# Patient Record
Sex: FEMALE | Race: WHITE | Employment: STUDENT | URBAN - METROPOLITAN AREA
[De-identification: names, ages, dates, MRNs, and addresses within clinical notes are randomized per-mention and may not be internally consistent; named-entity substitution may affect disease eponyms.]

---
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
## 2020-01-25 ENCOUNTER — OFFICE VISIT (OUTPATIENT)
Dept: URGENT CARE | Facility: URGENT CARE | Age: 20
End: 2020-01-25
Payer: COMMERCIAL

## 2020-01-25 VITALS
DIASTOLIC BLOOD PRESSURE: 85 MMHG | OXYGEN SATURATION: 100 % | SYSTOLIC BLOOD PRESSURE: 123 MMHG | HEART RATE: 94 BPM | TEMPERATURE: 97.9 F

## 2020-01-25 DIAGNOSIS — J03.90 TONSILLITIS: Primary | ICD-10-CM

## 2020-01-25 DIAGNOSIS — R07.0 THROAT PAIN: ICD-10-CM

## 2020-01-25 DIAGNOSIS — J02.9 ACUTE PHARYNGITIS, UNSPECIFIED ETIOLOGY: ICD-10-CM

## 2020-01-25 LAB
BASOPHILS # BLD AUTO: 0 10E9/L (ref 0–0.2)
BASOPHILS NFR BLD AUTO: 0.2 %
DEPRECATED S PYO AG THROAT QL EIA: NORMAL
DIFFERENTIAL METHOD BLD: NORMAL
EOSINOPHIL # BLD AUTO: 0 10E9/L (ref 0–0.7)
EOSINOPHIL NFR BLD AUTO: 0.3 %
ERYTHROCYTE [DISTWIDTH] IN BLOOD BY AUTOMATED COUNT: 12.2 % (ref 10–15)
HCT VFR BLD AUTO: 39.6 % (ref 35–47)
HETEROPH AB SER QL: NEGATIVE
HGB BLD-MCNC: 13.4 G/DL (ref 11.7–15.7)
LYMPHOCYTES # BLD AUTO: 1.4 10E9/L (ref 0.8–5.3)
LYMPHOCYTES NFR BLD AUTO: 14.9 %
MCH RBC QN AUTO: 29.6 PG (ref 26.5–33)
MCHC RBC AUTO-ENTMCNC: 33.8 G/DL (ref 31.5–36.5)
MCV RBC AUTO: 88 FL (ref 78–100)
MONOCYTES # BLD AUTO: 0.8 10E9/L (ref 0–1.3)
MONOCYTES NFR BLD AUTO: 8.4 %
NEUTROPHILS # BLD AUTO: 7.2 10E9/L (ref 1.6–8.3)
NEUTROPHILS NFR BLD AUTO: 76.2 %
PLATELET # BLD AUTO: 221 10E9/L (ref 150–450)
RBC # BLD AUTO: 4.52 10E12/L (ref 3.8–5.2)
SPECIMEN SOURCE: NORMAL
WBC # BLD AUTO: 9.4 10E9/L (ref 4–11)

## 2020-01-25 PROCEDURE — 86308 HETEROPHILE ANTIBODY SCREEN: CPT | Performed by: PHYSICIAN ASSISTANT

## 2020-01-25 PROCEDURE — 87880 STREP A ASSAY W/OPTIC: CPT | Performed by: PHYSICIAN ASSISTANT

## 2020-01-25 PROCEDURE — 87081 CULTURE SCREEN ONLY: CPT | Performed by: PHYSICIAN ASSISTANT

## 2020-01-25 PROCEDURE — 99214 OFFICE O/P EST MOD 30 MIN: CPT | Performed by: PHYSICIAN ASSISTANT

## 2020-01-25 PROCEDURE — 85025 COMPLETE CBC W/AUTO DIFF WBC: CPT | Performed by: PHYSICIAN ASSISTANT

## 2020-01-25 PROCEDURE — 36415 COLL VENOUS BLD VENIPUNCTURE: CPT | Performed by: PHYSICIAN ASSISTANT

## 2020-01-25 RX ORDER — CLINDAMYCIN HCL 300 MG
300 CAPSULE ORAL 3 TIMES DAILY
Qty: 30 CAPSULE | Refills: 0 | Status: SHIPPED | OUTPATIENT
Start: 2020-01-25 | End: 2020-01-27 | Stop reason: SINTOL

## 2020-01-25 NOTE — PROGRESS NOTES
SUBJECTIVE:  Maryam Carlson is a 20 year old female with a chief complaint of sore throat.  Onset of symptoms was 2 day(s) ago.    Course of illness: sudden onset, still present and worsening.  Severity moderate  Current and Associated symptoms: runny nose, stuffy nose, cough - productive, headache and fatigue  Treatment measures tried include Tylenol/Ibuprofen.  Predisposing factors include None.    Past Medical History:   Diagnosis Date     Asthma      Current Outpatient Medications   Medication Sig Dispense Refill     albuterol (PROAIR HFA/PROVENTIL HFA/VENTOLIN HFA) 108 (90 Base) MCG/ACT inhaler Inhale 2 puffs into the lungs every 6 hours as needed for shortness of breath / dyspnea or wheezing 1 Inhaler 3     citalopram (CELEXA) 20 MG tablet 1 tablet daily 90 tablet 1     ranitidine (ZANTAC) 300 MG tablet Take 1 tablet (300 mg) by mouth At Bedtime 30 tablet 1     Social History     Tobacco Use     Smoking status: Passive Smoke Exposure - Never Smoker     Smokeless tobacco: Never Used     Tobacco comment: Lives in smoke free household, Dad smokes outside   Substance Use Topics     Alcohol use: No       ROS:  CONSTITUTIONAL:fatigue  EYES: NEGATIVE for vision changes or irritation  ENT/MOUTH: nasal congestion and sore throat  RESP:cough-non productive very mild    OBJECTIVE:   /85   Pulse 94   Temp 97.9  F (36.6  C) (Tympanic)   SpO2 100%   GENERAL APPEARANCE: healthy, alert and no distress  EYES: EOMI,  PERRL, conjunctiva clear  HENT: TM's normal bilaterally, tonsillar hypertrophy, tonsillar erythema and tonsillar exudate  NECK: supple, non-tender to palpation, no adenopathy noted  RESP: lungs clear to auscultation - no rales, rhonchi or wheezes  CV: regular rates and rhythm, normal S1 S2, no murmur noted  SKIN: no suspicious lesions or rashes    Rapid Strep test is negative    ASSESSMENT:      1. Tonsillitis    - clindamycin (CLEOCIN) 300 MG capsule; Take 1 capsule (300 mg) by mouth 3 times daily for 10  days  Dispense: 30 capsule; Refill: 0    2. Acute pharyngitis, unspecified etiology    - CBC with platelets and differential  - Mononucleosis screen  - clindamycin (CLEOCIN) 300 MG capsule; Take 1 capsule (300 mg) by mouth 3 times daily for 10 days  Dispense: 30 capsule; Refill: 0    3. Throat pain    - Strep, Rapid Screen  - Beta strep group A culture  - CBC with platelets and differential  - Mononucleosis screen  - clindamycin (CLEOCIN) 300 MG capsule; Take 1 capsule (300 mg) by mouth 3 times daily for 10 days  Dispense: 30 capsule; Refill: 0    PLAN:   See orders in epic.   Symptomatic treat with gargles, lozenges, and OTC analgesic as needed. Follow-up with primary clinic if not improving over the next 3 days.  Advisement given that patient will be contagious for the next 24-48 hours after antibiotics initiated

## 2020-01-26 LAB
BACTERIA SPEC CULT: NORMAL
SPECIMEN SOURCE: NORMAL

## 2020-01-27 ENCOUNTER — MYC MEDICAL ADVICE (OUTPATIENT)
Dept: FAMILY MEDICINE | Facility: CLINIC | Age: 20
End: 2020-01-27

## 2020-01-27 DIAGNOSIS — J02.0 STREPTOCOCCAL PHARYNGITIS: Primary | ICD-10-CM

## 2020-01-27 RX ORDER — CEPHALEXIN 500 MG/1
500 CAPSULE ORAL 2 TIMES DAILY
Qty: 14 CAPSULE | Refills: 0 | Status: SHIPPED | OUTPATIENT
Start: 2020-01-27 | End: 2021-07-02

## 2020-01-27 NOTE — TELEPHONE ENCOUNTER
Left message on answering machine for patient/parent to call back.   [phone].  Radha Oliveira RN

## 2020-01-27 NOTE — TELEPHONE ENCOUNTER
Patient seen in urgent care Saturday  Patient prescribed Clindamycin for the following diagnoses:     Tonsillitis [J03.90]  - Primary       Throat pain [R07.0]       Acute pharyngitis, unspecified etiology           Patient has broken out in rash as noted below      To provider to advise      Melody HAMLINN, RN, CPN

## 2020-01-27 NOTE — TELEPHONE ENCOUNTER
Patient/parent is informed of MD note below, as it is written. Verbalized good understanding.  Patient reports not as sore, not as bad as it used to be.   Patient had 2.5 doses please advise  Radha Oliveira RN

## 2020-01-27 NOTE — TELEPHONE ENCOUNTER
I am aware that the patient has a pcn allergy and should consider getting testing with Dr. Potter.  If she received 3 days of the medication she does not need to take more if she is feeling better.  I have prescribed Cephalexin which has about a 6% reaction when there is a pcn reaction.she should fill this if not feeling better.  Strep may also cause a rash which is another reason to see Dr Potter.

## 2020-01-28 NOTE — TELEPHONE ENCOUNTER
Patient/parent is informed of MD note below, as it is written. Verbalized good understanding.  Radha Oliveira RN

## 2020-01-29 ENCOUNTER — MYC MEDICAL ADVICE (OUTPATIENT)
Dept: FAMILY MEDICINE | Facility: CLINIC | Age: 20
End: 2020-01-29

## 2020-01-29 NOTE — TELEPHONE ENCOUNTER
Per protocol, will route encounter to be cosigned by provider for Verbal Orders.  Radha Oliveira RN

## 2020-02-23 ENCOUNTER — HEALTH MAINTENANCE LETTER (OUTPATIENT)
Age: 20
End: 2020-02-23

## 2020-02-27 ENCOUNTER — OFFICE VISIT (OUTPATIENT)
Dept: ALLERGY | Facility: CLINIC | Age: 20
End: 2020-02-27
Payer: COMMERCIAL

## 2020-02-27 ENCOUNTER — TELEPHONE (OUTPATIENT)
Dept: ALLERGY | Facility: CLINIC | Age: 20
End: 2020-02-27

## 2020-02-27 ENCOUNTER — TELEPHONE (OUTPATIENT)
Dept: ALLERGY | Facility: OTHER | Age: 20
End: 2020-02-27

## 2020-02-27 VITALS
DIASTOLIC BLOOD PRESSURE: 73 MMHG | TEMPERATURE: 97.7 F | OXYGEN SATURATION: 99 % | WEIGHT: 115 LBS | HEIGHT: 64 IN | HEART RATE: 80 BPM | SYSTOLIC BLOOD PRESSURE: 120 MMHG | BODY MASS INDEX: 19.63 KG/M2

## 2020-02-27 DIAGNOSIS — J30.89 ALLERGIC RHINITIS DUE TO MOLD: ICD-10-CM

## 2020-02-27 DIAGNOSIS — J30.89 ALLERGIC RHINITIS DUE TO DUST MITE: Primary | ICD-10-CM

## 2020-02-27 DIAGNOSIS — Z91.018 TREE NUT ALLERGY: ICD-10-CM

## 2020-02-27 DIAGNOSIS — Z51.6 NEED FOR DESENSITIZATION TO ALLERGENS: ICD-10-CM

## 2020-02-27 DIAGNOSIS — T78.1XXD POLLEN-FOOD ALLERGY, SUBSEQUENT ENCOUNTER: ICD-10-CM

## 2020-02-27 DIAGNOSIS — J45.20 MILD INTERMITTENT ASTHMA WITHOUT COMPLICATION: Primary | ICD-10-CM

## 2020-02-27 DIAGNOSIS — J45.30 MILD PERSISTENT ASTHMA WITHOUT COMPLICATION: Primary | ICD-10-CM

## 2020-02-27 DIAGNOSIS — J30.1 SEASONAL ALLERGIC RHINITIS DUE TO POLLEN: ICD-10-CM

## 2020-02-27 DIAGNOSIS — Z88.0 PENICILLIN ALLERGY: ICD-10-CM

## 2020-02-27 DIAGNOSIS — J30.81 ALLERGIC RHINITIS DUE TO ANIMAL DANDER: ICD-10-CM

## 2020-02-27 DIAGNOSIS — J30.89 ALLERGIC RHINITIS DUE TO DUST MITE: ICD-10-CM

## 2020-02-27 PROBLEM — H10.9 RHINOCONJUNCTIVITIS: Status: ACTIVE | Noted: 2020-02-27

## 2020-02-27 PROBLEM — J31.0 RHINOCONJUNCTIVITIS: Status: ACTIVE | Noted: 2020-02-27

## 2020-02-27 LAB
FEF 25/75: NORMAL
FEV-1: NORMAL
FEV1/FVC: NORMAL
FVC: NORMAL

## 2020-02-27 PROCEDURE — 99204 OFFICE O/P NEW MOD 45 MIN: CPT | Mod: 25 | Performed by: ALLERGY & IMMUNOLOGY

## 2020-02-27 PROCEDURE — 95004 PERQ TESTS W/ALRGNC XTRCS: CPT | Performed by: ALLERGY & IMMUNOLOGY

## 2020-02-27 PROCEDURE — 94010 BREATHING CAPACITY TEST: CPT | Performed by: ALLERGY & IMMUNOLOGY

## 2020-02-27 RX ORDER — BUDESONIDE AND FORMOTEROL FUMARATE DIHYDRATE 80; 4.5 UG/1; UG/1
AEROSOL RESPIRATORY (INHALATION)
Qty: 1 INHALER | Refills: 3 | Status: SHIPPED | OUTPATIENT
Start: 2020-02-27

## 2020-02-27 RX ORDER — TRIAMCINOLONE ACETONIDE 55 UG/1
2 SPRAY, METERED NASAL DAILY
Qty: 2 BOTTLE | Refills: 3 | Status: SHIPPED | OUTPATIENT
Start: 2020-02-27 | End: 2020-06-29

## 2020-02-27 RX ORDER — MONTELUKAST SODIUM 10 MG/1
10 TABLET ORAL AT BEDTIME
Qty: 30 TABLET | Refills: 4 | Status: SHIPPED | OUTPATIENT
Start: 2020-02-27

## 2020-02-27 RX ORDER — OLOPATADINE HYDROCHLORIDE 1 MG/ML
1 SOLUTION/ DROPS OPHTHALMIC 2 TIMES DAILY
Qty: 1 BOTTLE | Refills: 4 | Status: SHIPPED | OUTPATIENT
Start: 2020-02-27 | End: 2020-06-29

## 2020-02-27 RX ORDER — EPINEPHRINE 0.3 MG/.3ML
0.3 INJECTION SUBCUTANEOUS PRN
Qty: 2 EACH | Refills: 1 | Status: SHIPPED | OUTPATIENT
Start: 2020-02-27

## 2020-02-27 RX ORDER — ALBUTEROL SULFATE 90 UG/1
2-4 AEROSOL, METERED RESPIRATORY (INHALATION) EVERY 4 HOURS PRN
Qty: 1 INHALER | Refills: 1 | Status: SHIPPED | OUTPATIENT
Start: 2020-02-27

## 2020-02-27 ASSESSMENT — ASTHMA QUESTIONNAIRES
QUESTION_5 LAST FOUR WEEKS HOW WOULD YOU RATE YOUR ASTHMA CONTROL: WELL CONTROLLED
QUESTION_3 LAST FOUR WEEKS HOW OFTEN DID YOUR ASTHMA SYMPTOMS (WHEEZING, COUGHING, SHORTNESS OF BREATH, CHEST TIGHTNESS OR PAIN) WAKE YOU UP AT NIGHT OR EARLIER THAN USUAL IN THE MORNING: ONCE A WEEK
ACT_TOTALSCORE: 18
QUESTION_4 LAST FOUR WEEKS HOW OFTEN HAVE YOU USED YOUR RESCUE INHALER OR NEBULIZER MEDICATION (SUCH AS ALBUTEROL): ONCE A WEEK OR LESS
QUESTION_1 LAST FOUR WEEKS HOW MUCH OF THE TIME DID YOUR ASTHMA KEEP YOU FROM GETTING AS MUCH DONE AT WORK, SCHOOL OR AT HOME: A LITTLE OF THE TIME
QUESTION_2 LAST FOUR WEEKS HOW OFTEN HAVE YOU HAD SHORTNESS OF BREATH: THREE TO SIX TIMES A WEEK
ACUTE_EXACERBATION_TODAY: YES

## 2020-02-27 ASSESSMENT — MIFFLIN-ST. JEOR: SCORE: 1276.64

## 2020-02-27 NOTE — TELEPHONE ENCOUNTER
Forwarding to Dr. Potter - please send rx for albuterol as discussed, thanks.    Patient will switch from PRN Symbicort to albuterol as the Symbicort was $200+/mo for her.  Pharmacist notified already.    Colette Streeter RN

## 2020-02-27 NOTE — PATIENT INSTRUCTIONS
Allergy Staff Appt Hours Shot Hours Locations    Physician     Bertrand Potter DO       Support Staff     NADIA Doran, [facility]  Tuesday:        [place] 7-4:20     Wednesday:        [place]: 7-5     Thursday:                    [place] 7-6:40     Friday:  [place]  7-2:40   [place]        Thursday: 1-5:50        Friday: 7-10:50     [place]        Tuesday: 7- 3:20        Wednesday: 7-4:20     [place] Monday: 7-4:20        Tuesday: 1-6:20         [facility]  [address] [place] [address]  Appt Line: [phone]  Allergy RN:  [phone]    [facility]  [address] [place] [address]  Appt Line: [phone]  Allergy RN:  [phone]       Important Scheduling Information  Aspirin Desensitization: Appt will last 2 clinic days. Please call the Allergy RN line for your clinic to schedule. Discontinue antihistamines 7 days prior to the appointment.     Food Challenges: Appt will last 3-4 hours. Please call the Allergy RN line for your clinic to schedule. Discontinue antihistamines 7 days prior to the appointment.     Penicillin Testing: Appt will last 2-3 hours. Please call the Allergy RN line for your clinic to schedule. Discontinue antihistamines 7 days prior to the appointment.     Skin Testing: Appt will about 40 minutes. Call the appointment line for your clinic to schedule. Discontinue antihistamines 7 days prior to the appointment.     Venom Testing: Appt will last 2-3 hours. Please call the Allergy RN line for your clinic to schedule. Discontinue antihistamines 7 days prior to the appointment.     Thank you for trusting us with your Allergy, Asthma, and Immunology care. Please feel free to contact us with any questions or concerns you may have.      -Nasacort 2 sprays per nostril daily.  -Zyrtec daily.  -Patanol 1 drop per eye twice daily as needed.  -Singulair 10 mg by mouth daily.  -Symbicort 80/4.5mcg 2 puffs inhaled every 4 hours as needed for chest  tightness, coughing, wheezing or shortness of breath.   -Return to clinic for penicillin testing.    AEROALLERGEN AVOIDANCE INSTRUCTIONS  MOLD  Indoors, mold season is year round. Outdoors, most mold prefer seasons with high humidity. Mold prefers damp, dark, warm places. Here are some tips on how to avoid mold exposure.  1.  Keep humidity inside between 35-50% with air conditioning or dehumidifier. The humidity level can be checked with a meter from a hardware store.   2.  Clean surfaces where mold grows and dry wet areas.  3.  Avoid steam cleaning carpets and discard moldy belongings.  4.  Wear a mask when doing yard work and refrain from walking through uncut fields or playing in leaves.  5.  Minimize use of potted plants and do not keep them indoors.  6.  Consider an allergy cover for the pillow and mattress.  POLLEN  Pollens are the tiny airborne particles given off by trees, weeds, and grasses. They can be the cause of seasonal allergic rhinitis or hay fever symptoms, which include stuffy, itchy, runny nose, redness, swelling and itching of the eyes, and itching of the ears and throat. Here are some tips on how to avoid pollen exposure.  1. .Keep windows closed and use the air conditioner when possible.  2.  Avoid outside exposure in the early morning as pollen counts are highest at that time.  3.  Take a shower and wash hair each night.  4.  Consider wearing a mask when working in the yard and/or garden.  5.  Clean furnace filter monthly with HEPA filters. Consider a HEPA filter vacuum  which will prevent pollen from being reintroduced into the air.   DUST MITES  Dust mites can never be entirely eliminated in the house no matter how clean your house is. Dust mites are attracted to warm, moist areas and feed on dead skin flakes. Here are tips to minimize dust mites in your home.  1.  Encase pillows and mattress/box springs in zippered allergy covers.  2.  Wash bedding in hot water (at least 130 F) every  7-14 days.  3.  Avoid curtains, carpet, and upholstered furniture if possible.  4.  Use HEPA air filters and a HEPA filter vacuum . Change filters monthly. Vacuum weekly.  5.  Keep bedroom simple, avoiding clutter, so it can quickly be dusted.  6.  Cover heating vents with vent filters.  7.  Keep stuffed toys in a closed container and wash or freeze regularly.  8.  Keep clothing in the closet with the door closed.  PETS  Pets present many problems for people with allergies. Dander from pets is very difficult to remove and also is a food source for dust mites.  1.  If possible, find the pet a new home.  2.  If not possible, keep the pet outdoors. Never allow the pet into the bedroom.  3.  Wash pet weekly in warm water.  4.  Encase mattresses, pillows, and box springs in allergen-proof covers.  5.  Use HEPA air filters and a HEPA filter vacuum . Change filters monthly.    ACCELERATED IMMUNOTHERAPY PATIENT INFORMATION    Immunotherapy is a treatment that alters the patient's immune system so they have less allergy symptoms, use less medications to control symptoms, have improved quality of life, and less health care utilization    Accelerated immunotherapy schedules are designed to allow patients to reach their maintenance immunotherapy dose in a shorter time frame than conventional immunotherapy.    Clinical benefit can be reached more rapidly using accelerated immunotherapy.     Many patients do not want to start allergen immunotherapy secondary to the upfront time commitment associated with conventional allergy shots. Cluster immunotherapy would allow the patient to be on monthly injections in a much shorter period of time. The maintenance dose is typically reached within 8 to 9 weeks.     Cluster immunotherapy has a similar risk of systemic reaction to conventional immunotherapy. The patient will need to take oral antihistamines to pre-medicate prior to injection appointments while going through this  treatment.    CLUSTER IMMUNOTHERAPY PATIENT INSTRUCTIONS    Asthma medications must be continued and asthma must be well controlled prior to receiving CLUSTER immunotherapy. Immunotherapy should not be given if you are feeling ill.    Other allergy medications may be continued    You should plan to spend approximately 2 hours at the clinic. Please feel free to bring things to occupy your time such as books, work, or computers. You may also wish to bring something to eat as you will not be able to leave the clinic once the procedure has begun.    You will be required to bring an epinephrine auto-injector with you to your CLUSTER immunotherapy appointments and all subsequent allergy shot appointments    You will be required to take the following pre-medication regimen prior  to your CLUSTER immunotherapy appointments  o Medications to be taken 1 day prior to CLUSTER:  - Zyrtec (cetirizine) 20mg or Allegra (fexofenadine) 360mg twice daily  o Medications to be taken the morning of CLUSTER:  - Zyrtec (cetirizine) 20mg or Allegra (fexofenadine) 360mg

## 2020-02-27 NOTE — ASSESSMENT & PLAN NOTE
History of hives after receiving amoxicillin at 8 years of age.  Subsequently avoided all penicillin antibiotics.    - Discussed with patient and family that only about 15% of those that think they are allergic actually are and of those 15%, 80% outgrow their penicillin allergy within 10 years.   - Fortunately, there is skin testing to ascertain if patient is truly allergic.   - Would recommend patient return to clinic for penicillin testing and oral challenge. Discussed testing with family and patient.

## 2020-02-27 NOTE — LETTER
My Asthma Action Plan    Name: Maryam Carlson   YOB: 2000  Date: 2/27/2020   My doctor: Bertrand Potter, DO   My clinic: [facility]        My Control Medicine: Montelukast (Singulair) -  10 mg daily  My Rescue Medicine:   Symbicort 80/4.5mcg 2 puffs inhaled every 4 hours as needed for chest tightness, coughing, wheezing or shortness of breath.    My Asthma Severity:   Intermittent / Exercise Induced  Know your asthma triggers: animal dander              GREEN ZONE   Good Control    I feel good    No cough or wheeze    Can work, sleep and play without asthma symptoms       Take your asthma control medicine every day.     1. If exercise triggers your asthma, take your rescue medication    15 minutes before exercise or sports, and    During exercise if you have asthma symptoms  2. Spacer to use with inhaler: If you have a spacer, make sure to use it with your inhaler             YELLOW ZONE Getting Worse  I have ANY of these:    I do not feel good    Cough or wheeze    Chest feels tight    Wake up at night   1. Keep taking your Green Zone medications  2. Start taking your rescue medicine:    every 20 minutes for up to 1 hour. Then every 4 hours for 24-48 hours.  3. If you stay in the Yellow Zone for more than 12-24 hours, contact your doctor.  4. If you do not return to the Green Zone in 12-24 hours or you get worse, start taking your oral steroid medicine if prescribed by your provider.           RED ZONE Medical Alert - Get Help  I have ANY of these:    I feel awful    Medicine is not helping    Breathing getting harder    Trouble walking or talking    Nose opens wide to breathe       1. Take your rescue medicine NOW  2. If your provider has prescribed an oral steroid medicine, start taking it NOW  3. Call your doctor NOW  4. If you are still in the Red Zone after 20 minutes and you have not reached your doctor:    Take your rescue medicine again and    Call 911 or go to the emergency  room right away    See your regular doctor within 2 weeks of an Emergency Room or Urgent Care visit for follow-up treatment.          Annual Reminders:  Meet with Asthma Educator,  Flu Shot in the Fall, consider Pneumonia Vaccination for patients with asthma (aged 19 and older).    Pharmacy:    [id number] - [place], [place] - [address] [id number] - [place], [place] - [address]    Electronically signed by Bertrand Potter, DO   Date: 02/27/20                      Asthma Triggers  How To Control Things That Make Your Asthma Worse    Triggers are things that make your asthma worse.  Look at the list below to help you find your triggers and what you can do about them.  You can help prevent asthma flare-ups by staying away from your triggers.      Trigger                                                          What you can do   Cigarette Smoke  Tobacco smoke can make asthma worse. Do not allow smoking in your home, car or around you.  Be sure no one smokes at a child s day care or school.  If you smoke, ask your health care provider for ways to help you quit.  Ask family members to quit too.  Ask your health care provider for a referral to Quit Plan to help you quit smoking, or call 4-191-798-PLAN.     Colds, Flu, Bronchitis  These are common triggers of asthma. Wash your hands often.  Don t touch your eyes, nose or mouth.  Get a flu shot every year.     Dust Mites  These are tiny bugs that live in cloth or carpet. They are too small to see. Wash sheets and blankets in hot water every week.   Encase pillows and mattress in dust mite proof covers.  Avoid having carpet if you can. If you have carpet, vacuum weekly.   Use a dust mask and HEPA vacuum.   Pollen and Outdoor Mold  Some people are allergic to trees, grass, or weed pollen, or molds. Try to keep your windows closed.  Limit time out doors when pollen count is high.   Ask you health care provider about taking medicine  during allergy season.     Animal Dander  Some people are allergic to skin flakes, urine or saliva from pets with fur or feathers. Keep pets with fur or feathers out of your home.    If you can t keep the pet outdoors, then keep the pet out of your bedroom.  Keep the bedroom door closed.  Keep pets off cloth furniture and away from stuffed toys.     Mice, Rats, and Cockroaches   Some people are allergic to the waste from these pests.   Cover food and garbage.  Clean up spills and food crumbs.  Store grease in the refrigerator.   Keep food out of the bedroom.   Indoor Mold  This can be a trigger if your home has high moisture. Fix leaking faucets, pipes, or other sources of water.   Clean moldy surfaces.  Dehumidify basement if it is damp and smelly.   Smoke, Strong Odors, and Sprays  These can reduce air quality. Stay away from strong odors and sprays, such as perfume, powder, hair spray, paints, smoke incense, paint, cleaning products, candles and new carpet.   Exercise or Sports  Some people with asthma have this trigger. Be active!  Ask your doctor about taking medicine before sports or exercise to prevent symptoms.    Warm up for 5-10 minutes before and after sports or exercise.     Other Triggers of Asthma  Cold air:  Cover your nose and mouth with a scarf.  Sometimes laughing or crying can be a trigger.  Some medicines and food can trigger asthma.

## 2020-02-27 NOTE — ASSESSMENT & PLAN NOTE
Shortness of breath, coughing, wheezing and tightness in chest with physical activity.  Additionally symptoms around cats.  Albuterol prior to physical activity is beneficial.    Spirometry with poor effort.  Cannot interpret.  ACT 18    -Starting Singulair for allergies and will ascertain if beneficial for chest symptoms.  -Symbicort 80/4.5mcg 2 puffs inhaled every 4 hours as needed for chest tightness, coughing, wheezing or shortness of breath.

## 2020-02-27 NOTE — PROGRESS NOTES
Maryam Carlson is a 20 year old White female with previous medical history significant for allergic rhinitis, asthma, medication allergies. Maryam Carlson is being seen today for evaluation of allergies to food, allergies to medication, asthma and seasonal allergies.     The patient reports that she has perennial with spring and fall nasal and ocular symptoms.  Angry symptoms around cats, raking leaves, dust and perfumes.  Symptoms include rhinorrhea, sneezing, congestion, postnasal drainage, nasal itching, ocular itching, ocular redness, ocular watering.  She has tried over-the-counter antihistamines, nasal corticosteroids and ocular antihistamines.  Not beneficial.  No use of montelukast.  Decrease in smell.  Intermittent colored mucus.  No history of allergy testing.  No history of allergen immunotherapy.  No history of ENT evaluation.    Patient reports that at 8 years of age she received a penicillin antibiotic.  She developed hives.  Subsequently avoided.    With tree nuts, banana and cantaloupe she will develop itching of her mouth, throat itching and throat swelling.  Symptoms will persist 5 hours.  No other IgE mediated symptoms.    Patient has asthma.  Asthma has been present her entire life.  Presents with shortness of breath, coughing, wheezing and tightness in chest.  Symptoms flare with cats and with physical activity.  Using albuterol prior to physical activity and beneficial.  She has never been on inhaled corticosteroid.  No other triggers for chest symptoms.  No hospitalizations or ER visits.    ENVIRONMENTAL HISTORY: [place] The home is heated with a forced air. They does have central air conditioning. The patient's bedroom is furnished with carpeting in bedroom.  Pets inside the house include 3 dog(s). There is not history of cockroach or mice infestation. There is/are 1 smokers in the house (garage only).  The house does not have a damp basement.      ACT Total Scores 2/27/2020   ACT TOTAL SCORE (Goal Greater than or Equal to 20) 18   In the past 12 months, how many times did you visit the emergency room for your asthma without being admitted to the hospital? 0   In the past 12 months, how many times were you hospitalized overnight because of your asthma? 0     Past Medical History:   Diagnosis Date     Asthma      Family History   Problem Relation Age of Onset     Cancer Paternal Grandfather      Diabetes Paternal Grandfather      Diabetes Maternal Grandmother      Hypertension Maternal Grandmother      Thyroid Disease Maternal Grandfather      Cancer Maternal Aunt      Cerebrovascular Disease No family hx of      Glaucoma No family hx of      Macular Degeneration No family hx of      History reviewed. No pertinent surgical history.    REVIEW OF SYSTEMS:  General: negative for weight gain. negative for weight loss. negative for changes in sleep.   Ears: negative for fullness. negative for hearing loss. negative for dizziness.   Nose: negative for snoring.negative for changes in smell. positive  for drainage.   Eyes: positive  for eye watering. positive  for eye itching. negative for vision changes. positive  for eye redness.  Throat: negative for hoarseness. negative for sore throat. positive  for trouble swallowing.   Lungs: positive  for shortness of breath.positive  for wheezing. positive  for sputum production.   Cardiovascular: negative for chest pain. negative for swelling of ankles. negative for fast or irregular heartbeat.   Gastrointestinal: negative for nausea. positive  for heartburn. positive  for acid reflux.   Musculoskeletal: negative for joint pain. negative for joint stiffness. negative for joint swelling.   Neurologic: negative for seizures. positive  for fainting. negative for weakness.   Psychiatric: negative for changes in mood. negative for anxiety.   Endocrine: negative for cold intolerance. negative for heat intolerance. negative for  tremors.   Lymphatic: negative for lower extremity swelling. negative for lymph node swelling.   Hematologic: negative for easy bruising. negative for easy bleeding.  Integumentary: negative for rash. negative for scaling. negative for nail changes.       Current Outpatient Medications:      albuterol (PROAIR HFA/PROVENTIL HFA/VENTOLIN HFA) 108 (90 Base) MCG/ACT inhaler, Inhale 2 puffs into the lungs every 6 hours as needed for shortness of breath / dyspnea or wheezing, Disp: 1 Inhaler, Rfl: 3     budesonide-formoterol (SYMBICORT) 80-4.5 MCG/ACT Inhaler, Symbicort 80/4.5mcg 2 puffs inhaled every 4 hours as needed for chest tightness, coughing, wheezing or shortness of breath., Disp: 1 Inhaler, Rfl: 3     citalopram (CELEXA) 20 MG tablet, 1 tablet daily, Disp: 90 tablet, Rfl: 1     EPINEPHrine (AUVI-Q) 0.3 MG/0.3ML injection 2-pack, Inject 0.3 mLs (0.3 mg) into the muscle as needed for anaphylaxis, Disp: 2 each, Rfl: 1     montelukast (SINGULAIR) 10 MG tablet, Take 1 tablet (10 mg) by mouth At Bedtime, Disp: 30 tablet, Rfl: 4     olopatadine (PATANOL) 0.1 % ophthalmic solution, Place 1 drop into both eyes 2 times daily, Disp: 1 Bottle, Rfl: 4     ranitidine (ZANTAC) 300 MG tablet, Take 1 tablet (300 mg) by mouth At Bedtime, Disp: 30 tablet, Rfl: 1     triamcinolone (NASACORT) 55 MCG/ACT nasal aerosol, Spray 2 sprays into both nostrils daily, Disp: 2 Bottle, Rfl: 3     cephALEXin (KEFLEX) 500 MG capsule, Take 1 capsule (500 mg) by mouth 2 times daily (Patient not taking: Reported on 2/27/2020), Disp: 14 capsule, Rfl: 0  Immunization History   Administered Date(s) Administered     DTAP (<7y) 2000, 2000, 2000, 10/17/2001, 04/27/2004     HEPA 08/17/2011, 02/17/2012     HPV 07/30/2014, 11/07/2014, 03/09/2015     HepB 2000, 2000, 01/31/2001     Hib (PRP-T) 2000, 2000, 01/31/2001     MMR 10/17/2001, 04/27/2004     Meningococcal (Menactra ) 08/17/2011, 06/20/2017     Poliovirus,  inactivated (IPV) 2000, 2000, 2000, 10/17/2001, 04/27/2004     TDAP Vaccine (Adacel) 08/17/2011     Varicella 01/31/2001, 11/09/2009     Allergies   Allergen Reactions     Penicillins Hives     Tree Nuts [Nuts] Itching         EXAM:   Constitutional:  Appears well-developed and well-nourished. No distress.   HEENT:   Head: Normocephalic.   Cobblestoning of posterior oropharynx.   Boggy nasal tissue and pale.    Eyes: Conjunctivae are non-erythematous   No maxillary or frontal sinus tenderness to palpation.   Cardiovascular: Normal rate, regular rhythm and normal heart sounds. Exam reveals no gallop and no friction rub.   No murmur heard.  Respiratory: Effort normal and breath sounds normal. No respiratory distress. No wheezes. No rales.   Musculoskeletal: Normal range of motion.   Neuro: Oriented to person, place, and time.  Skin: Skin is warm and dry. No rash noted.   Psychiatric: Normal mood and affect.     Nursing note and vitals reviewed.      WORKUP:   Spirometry  FVC % pred:63  FEV1 % pred:72  FEV1/FVC % act:100    ENVIRONMENTAL PERCUTANEOUS SKIN TESTING: ADULT  [facility] 2/27/2020   Consent Y   Ordering Physician Tariq   Interpreting Physician Tariq   Testing Technician Colette PEREZ   Location Back   Time start: 11:25 AM   Time End: 11:40 AM   Positive Control: Histatrol*ALK 1 mg/ml 3/35   Negative Control: 50% Glycerin 0   Cat Hair*ALK (10,000 BAU/ml) 16/45   AP Dog Hair/Dander (1:100 w/v) 5/35   Dust Mite p. 30,000 AU/ml 6/40   Dust Mite f. (30,000 AU/ml) 0   Abdulkadir (W/F in millimeters) 5/35   Peter Grass (100,000 BAU/mL) 16/60   Red [place]ergus (W/F in millimeters) 5/40   Maple/[place] (W/F in millimeters) 20/45   Hackberry (W/F in millimeters) 8/45   Weld (W/F in millimeters) 6/30   Draper *ALK (W/F in millimeters) 0   American Elm (W/F in millimeters) 8/40   Harper (W/F in millimeters) 20/40   Black [place] (W/F in millimeters) 6/30   Birch Mix (W/F in millimeters)  25/60   Baltic (W/F in millimeters) 8/45   Irving (W/F in millimeters) 0   Cocklebur (W/F in millimeters) 5/30   Richmond (W/F in millimeters) 20/40   White Omar (W/F in millimeters) 12/40   Careless (W/F in millimeters) 8/30   Nettle (W/F in millimeters) 4/32   English Plantain (W/F in millimeters) 4/40   Kochia (W/F in millimeters) 8/32   Lamb's Quarter (W/F in millimeters) 7/40   Marshelder (W/F in millimeters) 4/32   Ragweed Mix* ALK (W/F in millimeters) 20/45   Russian Thistle (W/F in millimeters) 8/40   Sagebrush/Mugwort (W/F in millimeters) 3/35   Sheep Sorrel (W/F in millimeters) 6/30   Feather Mix* ALK (W/F in millimeters) 0   Penicillium Mix (1:10 w/v) 0   Curvularia spicifera (1:10 w/v) 4/30   Epicoccum (1:10 w/v) 5/30   Aspergillus fumigatus (1:10 w/v): 0   Alternaria tenius (1:10 w/v) 7/35   H. Cladosporium (1:10 w/v) 0   Phoma herbarum (1:10 w/v) 0      NUTS/SHELLFISH ALLERGEN PERCUTANEOUS SKIN TESTING  [place] nuts & shellfish 2/27/2020   Consent Y   Ordering Physician Tariq   Interpreting Physician Tariq   Testing Technician Colette PEREZ   Location Back   Time start: 11:25 AM   Time End: 11:40 AM   Positive Control: Histatrol*ALK 1 mg/ml 3/35   Negative Control: 50% Glycerin** Bar Osorio 0   Selection: Nuts   Peanut 1:20 (W/F in millimeters) -   Anchor  1:20 (W/F in millimeters) 7/35   Cashew  1:20 (W/F in millimeters) 0/30   Pecan  1:20 (W/F in millimeters) 0/10   Pistachio*ALK (1:10 w/v) 3/30   Paradise 1:20 (W/F in millimeters) 0/30   Hazelnut (Filbert)  1:20 (W/F in millimeters) 8/40   Brazil Nut  1:20 (W/F in millimeters) 0/20          ASSESSMENT/PLAN:  Problem List Items Addressed This Visit        Respiratory    Mild persistent asthma without complication - Primary     Shortness of breath, coughing, wheezing and tightness in chest with physical activity.  Additionally symptoms around cats.  Albuterol prior to physical activity is beneficial.    Spirometry with poor effort.  Cannot interpret.  ACT  18    -Starting Singulair for allergies and will ascertain if beneficial for chest symptoms.  -Symbicort 80/4.5mcg 2 puffs inhaled every 4 hours as needed for chest tightness, coughing, wheezing or shortness of breath.            Relevant Medications    budesonide-formoterol (SYMBICORT) 80-4.5 MCG/ACT Inhaler    montelukast (SINGULAIR) 10 MG tablet    triamcinolone (NASACORT) 55 MCG/ACT nasal aerosol    Allergic rhinitis due to dust mite     Perennial with spring and fall allergy symptoms.  Increase him times around cats.  Nasal and ocular symptoms.  Still symptomatic despite oral antihistamines, nasal corticosteroids and ocular antihistamines.    Allergy testing:  Positive for trees, weeds, cat, dog, dust mite, grass, molds.    -Allergen avoidance measures provided and discussed with the patient.  -Nasacort 2 sprays per nostril daily.  -Zyrtec daily.  -Patanol 1 drop per eye twice daily as needed.  -Singulair 10 mg by mouth daily.  - The patient has a history of allergic rhinoconjunctivitis and allergic asthma and has exhausted all medical therapies without success in controlling symptoms. Immunotherapy was discussed as a treatment option with patient and family. Risks/benefits of immunotherapy were discussed and the patient/family wishes to proceed with allergen immunotherapy.   - The patient will be prescribed a injectable epinephrine device and will need to bring this with them to allergy shot appointments and carry with them for the rest of the day after they receive the allergy shot. Discussed signs and symptoms of a systemic reaction and when to use injectable epinephrine.   - Oral antihistamine daily prior to receiving allergy shot.            Relevant Medications    budesonide-formoterol (SYMBICORT) 80-4.5 MCG/ACT Inhaler    montelukast (SINGULAIR) 10 MG tablet    triamcinolone (NASACORT) 55 MCG/ACT nasal aerosol    olopatadine (PATANOL) 0.1 % ophthalmic solution    Other Relevant Orders    ALLERGY SKIN  TESTS,ALLERGENS [40958] (Completed)    Allergic rhinitis due to animal dander    Relevant Medications    budesonide-formoterol (SYMBICORT) 80-4.5 MCG/ACT Inhaler    montelukast (SINGULAIR) 10 MG tablet    triamcinolone (NASACORT) 55 MCG/ACT nasal aerosol    Seasonal allergic rhinitis due to pollen    Relevant Medications    budesonide-formoterol (SYMBICORT) 80-4.5 MCG/ACT Inhaler    montelukast (SINGULAIR) 10 MG tablet    triamcinolone (NASACORT) 55 MCG/ACT nasal aerosol    Allergic rhinitis due to mold    Relevant Medications    budesonide-formoterol (SYMBICORT) 80-4.5 MCG/ACT Inhaler    montelukast (SINGULAIR) 10 MG tablet    triamcinolone (NASACORT) 55 MCG/ACT nasal aerosol       Other    Pollen-food allergy, subsequent encounter     Mouth itching, throat itching and throat tightness with tree nuts, banana and cantaloupe.    Positive testing for tree nuts. Possible oral allergy syndrome as noted below vs IgE mediated food allergy. Anaphylaxis action plan provided and reviewed. Script for epipen provided.     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 This can occur in some uncooked fruits and vegetables, but does not occur with cooked fruits and vegetables. Risk of systemic reaction is low. The patient should avoid raw fruits and vegetables that cause symptoms, but okay to consume in the cooked form.            Relevant Medications    budesonide-formoterol (SYMBICORT) 80-4.5 MCG/ACT Inhaler    montelukast (SINGULAIR) 10 MG tablet    triamcinolone (NASACORT) 55 MCG/ACT nasal aerosol    Penicillin allergy     History of hives after receiving amoxicillin at 8 years of age.  Subsequently avoided all penicillin antibiotics.    - Discussed with patient and family that only about 15% of those that think they are allergic actually are and of those 15%, 80% outgrow their penicillin allergy within 10  years.   - Fortunately, there is skin testing to ascertain if patient is truly allergic.   - Would recommend patient return to clinic for penicillin testing and oral challenge. Discussed testing with family and patient.            Relevant Medications    budesonide-formoterol (SYMBICORT) 80-4.5 MCG/ACT Inhaler    montelukast (SINGULAIR) 10 MG tablet    triamcinolone (NASACORT) 55 MCG/ACT nasal aerosol    Need for desensitization to allergens    Relevant Medications    EPINEPHrine (AUVI-Q) 0.3 MG/0.3ML injection 2-pack    Tree nut allergy    Relevant Medications    budesonide-formoterol (SYMBICORT) 80-4.5 MCG/ACT Inhaler    montelukast (SINGULAIR) 10 MG tablet    triamcinolone (NASACORT) 55 MCG/ACT nasal aerosol        rtc in 6 months    Chart documentation with Dragon Voice recognition Software. Although reviewed after completion, some words and grammatical errors may remain.    Bertrand Potter DO FAAAAI  Allergy/Immunology  [place], [place]

## 2020-02-27 NOTE — LETTER
[facility]                   FOOD ALLERGY & ANAPHYLAXIS EMERGENCY CARE PLAN  [employer] Education         Name: Maraym Brownangelina    :  [id number]    Allergy to: Tree nuts  Weight: 115 lbs 0 oz lbs.  Asthma:  Yes  (higher risk for a severe reaction)    -NOTE: Do not depend on antihistamines or inhalers (bronchodilators) to treat a severe reaction. USE EPINEPHRINE.     MEDICATIONS/DOSES  Epinephrine Brand: Auvi Q  Epinephrine Dose: 0.3 mg IM  Antihistamine Brand or Generic: Zyrtec (Cetirizine)  Antihistamine Dose: 10mg  Other (e.g., inhaler-bronchodilator if wheezing): albuterol       FARE                   FOOD ALLERGY & ANAPHYLAXIS EMERGENCY CARE PLAN   [employer] Education           EMERGENCY CONTACTS - CALL 911  DOCTOR:  Bertrand Potter DO   PHONE: [phone]  PARENT/GUARDIAN:              PHONE:  OTHER EMERGENCY CONTACTS  NAME/RELATIONSHIP:   PHONE:   NAME/RELATIONSHIP:    PHONE:           PARENT/GUARDIAN AUTHORIZATION SIGNATURE     DATE              PHYSICIAN/H CP AUTHORIZATION SIGNATURE         DATE  FORM PROVIDED [employer] FOOD [employer]ERGY [employer] EDUC[employer] (FARE) [email or web address]) 2014

## 2020-02-27 NOTE — ASSESSMENT & PLAN NOTE
Perennial with spring and fall allergy symptoms.  Increase him times around cats.  Nasal and ocular symptoms.  Still symptomatic despite oral antihistamines, nasal corticosteroids and ocular antihistamines.    Allergy testing:  Positive for trees, weeds, cat, dog, dust mite, grass, molds.    -Allergen avoidance measures provided and discussed with the patient.  -Nasacort 2 sprays per nostril daily.  -Zyrtec daily.  -Patanol 1 drop per eye twice daily as needed.  -Singulair 10 mg by mouth daily.  - The patient has a history of allergic rhinoconjunctivitis and allergic asthma and has exhausted all medical therapies without success in controlling symptoms. Immunotherapy was discussed as a treatment option with patient and family. Risks/benefits of immunotherapy were discussed and the patient/family wishes to proceed with allergen immunotherapy.   - The patient will be prescribed a injectable epinephrine device and will need to bring this with them to allergy shot appointments and carry with them for the rest of the day after they receive the allergy shot. Discussed signs and symptoms of a systemic reaction and when to use injectable epinephrine.   - Oral antihistamine daily prior to receiving allergy shot.

## 2020-02-27 NOTE — LETTER
2/27/2020         RE: Maryam Carlson  [address]  [place] [address]        Dear Colleague,    Thank you for referring your patient, Maryam Carlson, to the [facility]. Please see a copy of my visit note below.    Maryam Carlson is a 20 year old White female with previous medical history significant for allergic rhinitis, asthma, medication allergies. Maryam Carlson is being seen today for evaluation of allergies to food, allergies to medication, asthma and seasonal allergies.     The patient reports that she has perennial with spring and fall nasal and ocular symptoms.  Angry symptoms around cats, raking leaves, dust and perfumes.  Symptoms include rhinorrhea, sneezing, congestion, postnasal drainage, nasal itching, ocular itching, ocular redness, ocular watering.  She has tried over-the-counter antihistamines, nasal corticosteroids and ocular antihistamines.  Not beneficial.  No use of montelukast.  Decrease in smell.  Intermittent colored mucus.  No history of allergy testing.  No history of allergen immunotherapy.  No history of ENT evaluation.    Patient reports that at 8 years of age she received a penicillin antibiotic.  She developed hives.  Subsequently avoided.    With tree nuts, banana and cantaloupe she will develop itching of her mouth, throat itching and throat swelling.  Symptoms will persist 5 hours.  No other IgE mediated symptoms.    Patient has asthma.  Asthma has been present her entire life.  Presents with shortness of breath, coughing, wheezing and tightness in chest.  Symptoms flare with cats and with physical activity.  Using albuterol prior to physical activity and beneficial.  She has never been on inhaled corticosteroid.  No other triggers for chest symptoms.  No hospitalizations or ER visits.    ENVIRONMENTAL HISTORY: [place] The home is heated with a forced air. They does have central air conditioning. The  patient's bedroom is furnished with carpeting in bedroom.  Pets inside the house include 3 dog(s). There is not history of cockroach or mice infestation. There is/are 1 smokers in the house (garage only).  The house does not have a damp basement.     ACT Total Scores 2/27/2020   ACT TOTAL SCORE (Goal Greater than or Equal to 20) 18   In the past 12 months, how many times did you visit the emergency room for your asthma without being admitted to the hospital? 0   In the past 12 months, how many times were you hospitalized overnight because of your asthma? 0     Past Medical History:   Diagnosis Date     Asthma      Family History   Problem Relation Age of Onset     Cancer Paternal Grandfather      Diabetes Paternal Grandfather      Diabetes Maternal Grandmother      Hypertension Maternal Grandmother      Thyroid Disease Maternal Grandfather      Cancer Maternal Aunt      Cerebrovascular Disease No family hx of      Glaucoma No family hx of      Macular Degeneration No family hx of      History reviewed. No pertinent surgical history.    REVIEW OF SYSTEMS:  General: negative for weight gain. negative for weight loss. negative for changes in sleep.   Ears: negative for fullness. negative for hearing loss. negative for dizziness.   Nose: negative for snoring.negative for changes in smell. positive  for drainage.   Eyes: positive  for eye watering. positive  for eye itching. negative for vision changes. positive  for eye redness.  Throat: negative for hoarseness. negative for sore throat. positive  for trouble swallowing.   Lungs: positive  for shortness of breath.positive  for wheezing. positive  for sputum production.   Cardiovascular: negative for chest pain. negative for swelling of ankles. negative for fast or irregular heartbeat.   Gastrointestinal: negative for nausea. positive  for heartburn. positive  for acid reflux.   Musculoskeletal: negative for joint pain. negative for joint stiffness. negative for joint  swelling.   Neurologic: negative for seizures. positive  for fainting. negative for weakness.   Psychiatric: negative for changes in mood. negative for anxiety.   Endocrine: negative for cold intolerance. negative for heat intolerance. negative for tremors.   Lymphatic: negative for lower extremity swelling. negative for lymph node swelling.   Hematologic: negative for easy bruising. negative for easy bleeding.  Integumentary: negative for rash. negative for scaling. negative for nail changes.       Current Outpatient Medications:      albuterol (PROAIR HFA/PROVENTIL HFA/VENTOLIN HFA) 108 (90 Base) MCG/ACT inhaler, Inhale 2 puffs into the lungs every 6 hours as needed for shortness of breath / dyspnea or wheezing, Disp: 1 Inhaler, Rfl: 3     budesonide-formoterol (SYMBICORT) 80-4.5 MCG/ACT Inhaler, Symbicort 80/4.5mcg 2 puffs inhaled every 4 hours as needed for chest tightness, coughing, wheezing or shortness of breath., Disp: 1 Inhaler, Rfl: 3     citalopram (CELEXA) 20 MG tablet, 1 tablet daily, Disp: 90 tablet, Rfl: 1     EPINEPHrine (AUVI-Q) 0.3 MG/0.3ML injection 2-pack, Inject 0.3 mLs (0.3 mg) into the muscle as needed for anaphylaxis, Disp: 2 each, Rfl: 1     montelukast (SINGULAIR) 10 MG tablet, Take 1 tablet (10 mg) by mouth At Bedtime, Disp: 30 tablet, Rfl: 4     olopatadine (PATANOL) 0.1 % ophthalmic solution, Place 1 drop into both eyes 2 times daily, Disp: 1 Bottle, Rfl: 4     ranitidine (ZANTAC) 300 MG tablet, Take 1 tablet (300 mg) by mouth At Bedtime, Disp: 30 tablet, Rfl: 1     triamcinolone (NASACORT) 55 MCG/ACT nasal aerosol, Spray 2 sprays into both nostrils daily, Disp: 2 Bottle, Rfl: 3     cephALEXin (KEFLEX) 500 MG capsule, Take 1 capsule (500 mg) by mouth 2 times daily (Patient not taking: Reported on 2/27/2020), Disp: 14 capsule, Rfl: 0  Immunization History   Administered Date(s) Administered     DTAP (<7y) 2000, 2000, 2000, 10/17/2001, 04/27/2004     HEPA 08/17/2011,  02/17/2012     HPV 07/30/2014, 11/07/2014, 03/09/2015     HepB 2000, 2000, 01/31/2001     Hib (PRP-T) 2000, 2000, 01/31/2001     MMR 10/17/2001, 04/27/2004     Meningococcal (Menactra ) 08/17/2011, 06/20/2017     Poliovirus, inactivated (IPV) 2000, 2000, 2000, 10/17/2001, 04/27/2004     TDAP Vaccine (Adacel) 08/17/2011     Varicella 01/31/2001, 11/09/2009     Allergies   Allergen Reactions     Penicillins Hives     Tree Nuts [Nuts] Itching         EXAM:   Constitutional:  Appears well-developed and well-nourished. No distress.   HEENT:   Head: Normocephalic.   Cobblestoning of posterior oropharynx.   Boggy nasal tissue and pale.    Eyes: Conjunctivae are non-erythematous   No maxillary or frontal sinus tenderness to palpation.   Cardiovascular: Normal rate, regular rhythm and normal heart sounds. Exam reveals no gallop and no friction rub.   No murmur heard.  Respiratory: Effort normal and breath sounds normal. No respiratory distress. No wheezes. No rales.   Musculoskeletal: Normal range of motion.   Neuro: Oriented to person, place, and time.  Skin: Skin is warm and dry. No rash noted.   Psychiatric: Normal mood and affect.     Nursing note and vitals reviewed.      WORKUP:   Spirometry  FVC % pred:63  FEV1 % pred:72  FEV1/FVC % act:100    ENVIRONMENTAL PERCUTANEOUS SKIN TESTING: ADULT  [facility] 2/27/2020   Consent Y   Ordering Physician Tariq   Interpreting Physician Tariq   Testing Technician Colette PEREZ   Location Back   Time start: 11:25 AM   Time End: 11:40 AM   Positive Control: Histatrol*ALK 1 mg/ml 3/35   Negative Control: 50% Glycerin 0   Cat Hair*ALK (10,000 BAU/ml) 16/45   AP Dog Hair/Dander (1:100 w/v) 5/35   Dust Mite p. 30,000 AU/ml 6/40   Dust Mite f. (30,000 AU/ml) 0   Abdulkadir (W/F in millimeters) 5/35   Peter Grass (100,000 BAU/mL) 16/60   Red [place] (W/F in millimeters) 5/40   Maple/Mono (W/F in millimeters) 20/45   Hackberry (W/F in  millimeters) 8/45   Pe [place] (W/F in millimeters) 6/30   [place] *ALK (W/F in millimeters) 0   American Elm (W/F in millimeters) 8/40   [place] (W/F in millimeters) 20/40   [place] (W/F in millimeters) 6/30   Birch Mix (W/F in millimeters) 25/60   [place] (W/F in millimeters) 8/45   [place] (W/F in millimeters) 0   Cocklebur (W/F in millimeters) 5/30   [place] (W/F in millimeters) 20/40   White Omar (W/F in millimeters) 12/40   Careless (W/F in millimeters) 8/30   Nettle (W/F in millimeters) 4/32   English Plantain (W/F in millimeters) 4/40   Kochia (W/F in millimeters) 8/32   Lamb's Quarter (W/F in millimeters) 7/40   Marshelder (W/F in millimeters) 4/32   Ragweed Mix* ALK (W/F in millimeters) 20/45   Russian Thistle (W/F in millimeters) 8/40   Sagebrush/Mugwort (W/F in millimeters) 3/35   Sheep Sorrel (W/F in millimeters) 6/30   Feather Mix* ALK (W/F in millimeters) 0   Penicillium Mix (1:10 w/v) 0   Curvularia spicifera (1:10 w/v) 4/30   Epicoccum (1:10 w/v) 5/30   Aspergillus fumigatus (1:10 w/v): 0   Alternaria tenius (1:10 w/v) 7/35   H. Cladosporium (1:10 w/v) 0   Phoma herbarum (1:10 w/v) 0      NUTS/SHELLFISH ALLERGEN PERCUTANEOUS SKIN TESTING  [place] nuts & shellfish 2/27/2020   Consent Y   Ordering Physician Tariq   Interpreting Physician Tariq   Testing Technician Colette PEREZ   Location Back   Time start: 11:25 AM   Time End: 11:40 AM   Positive Control: Histatrol*ALK 1 mg/ml 3/35   Negative Control: 50% Glycerin** Bar [place]sorio 0   Selection: Nuts   Peanut 1:20 (W/F in millimeters) -   [place]  1:20 (W/F in millimeters) 7/35   Cashew  1:20 (W/F in millimeters) 0/30   Pecan  1:20 (W/F in millimeters) 0/10   Pistachio*ALK (1:10 w/v) 3/30   [place] 1:20 (W/F in millimeters) 0/30   Hazelnut (Filbert)  1:20 (W/F in millimeters) 8/40   Brazil Nut  1:20 (W/F in millimeters) 0/20          ASSESSMENT/PLAN:  Problem List Items Addressed This Visit        Respiratory    Mild persistent asthma without  complication - Primary     Shortness of breath, coughing, wheezing and tightness in chest with physical activity.  Additionally symptoms around cats.  Albuterol prior to physical activity is beneficial.    Spirometry with poor effort.  Cannot interpret.  ACT 18    -Starting Singulair for allergies and will ascertain if beneficial for chest symptoms.  -Symbicort 80/4.5mcg 2 puffs inhaled every 4 hours as needed for chest tightness, coughing, wheezing or shortness of breath.            Relevant Medications    budesonide-formoterol (SYMBICORT) 80-4.5 MCG/ACT Inhaler    montelukast (SINGULAIR) 10 MG tablet    triamcinolone (NASACORT) 55 MCG/ACT nasal aerosol    Allergic rhinitis due to dust mite     Perennial with spring and fall allergy symptoms.  Increase him times around cats.  Nasal and ocular symptoms.  Still symptomatic despite oral antihistamines, nasal corticosteroids and ocular antihistamines.    Allergy testing:  Positive for trees, weeds, cat, dog, dust mite, grass, molds.    -Allergen avoidance measures provided and discussed with the patient.  -Nasacort 2 sprays per nostril daily.  -Zyrtec daily.  -Patanol 1 drop per eye twice daily as needed.  -Singulair 10 mg by mouth daily.  - The patient has a history of allergic rhinoconjunctivitis and allergic asthma and has exhausted all medical therapies without success in controlling symptoms. Immunotherapy was discussed as a treatment option with patient and family. Risks/benefits of immunotherapy were discussed and the patient/family wishes to proceed with allergen immunotherapy.   - The patient will be prescribed a injectable epinephrine device and will need to bring this with them to allergy shot appointments and carry with them for the rest of the day after they receive the allergy shot. Discussed signs and symptoms of a systemic reaction and when to use injectable epinephrine.   - Oral antihistamine daily prior to receiving allergy shot.            Relevant  Medications    budesonide-formoterol (SYMBICORT) 80-4.5 MCG/ACT Inhaler    montelukast (SINGULAIR) 10 MG tablet    triamcinolone (NASACORT) 55 MCG/ACT nasal aerosol    olopatadine (PATANOL) 0.1 % ophthalmic solution    Other Relevant Orders    ALLERGY SKIN TESTS,ALLERGENS [82581] (Completed)    Allergic rhinitis due to animal dander    Relevant Medications    budesonide-formoterol (SYMBICORT) 80-4.5 MCG/ACT Inhaler    montelukast (SINGULAIR) 10 MG tablet    triamcinolone (NASACORT) 55 MCG/ACT nasal aerosol    Seasonal allergic rhinitis due to pollen    Relevant Medications    budesonide-formoterol (SYMBICORT) 80-4.5 MCG/ACT Inhaler    montelukast (SINGULAIR) 10 MG tablet    triamcinolone (NASACORT) 55 MCG/ACT nasal aerosol    Allergic rhinitis due to mold    Relevant Medications    budesonide-formoterol (SYMBICORT) 80-4.5 MCG/ACT Inhaler    montelukast (SINGULAIR) 10 MG tablet    triamcinolone (NASACORT) 55 MCG/ACT nasal aerosol       Other    Pollen-food allergy, subsequent encounter     Mouth itching, throat itching and throat tightness with tree nuts, banana and cantaloupe.    Positive testing for tree nuts. Possible oral allergy syndrome as noted below vs IgE mediated food allergy. Anaphylaxis action plan provided and reviewed. Script for epipen provided.     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 This can occur in some uncooked fruits and vegetables, but does not occur with cooked fruits and vegetables. Risk of systemic reaction is low. The patient should avoid raw fruits and vegetables that cause symptoms, but okay to consume in the cooked form.            Relevant Medications    budesonide-formoterol (SYMBICORT) 80-4.5 MCG/ACT Inhaler    montelukast (SINGULAIR) 10 MG tablet    triamcinolone (NASACORT) 55 MCG/ACT nasal aerosol    Penicillin allergy     History of hives after receiving  amoxicillin at 8 years of age.  Subsequently avoided all penicillin antibiotics.    - Discussed with patient and family that only about 15% of those that think they are allergic actually are and of those 15%, 80% outgrow their penicillin allergy within 10 years.   - Fortunately, there is skin testing to ascertain if patient is truly allergic.   - Would recommend patient return to clinic for penicillin testing and oral challenge. Discussed testing with family and patient.            Relevant Medications    budesonide-formoterol (SYMBICORT) 80-4.5 MCG/ACT Inhaler    montelukast (SINGULAIR) 10 MG tablet    triamcinolone (NASACORT) 55 MCG/ACT nasal aerosol    Need for desensitization to allergens    Relevant Medications    EPINEPHrine (AUVI-Q) 0.3 MG/0.3ML injection 2-pack    Tree nut allergy    Relevant Medications    budesonide-formoterol (SYMBICORT) 80-4.5 MCG/ACT Inhaler    montelukast (SINGULAIR) 10 MG tablet    triamcinolone (NASACORT) 55 MCG/ACT nasal aerosol        rtc in 6 months    Chart documentation with Dragon Voice recognition Software. Although reviewed after completion, some words and grammatical errors may remain.    Bertrand Potter DO FAAAAI  Allergy/Immunology  [facility] and [place], [place]      Again, thank you for allowing me to participate in the care of your patient.        Sincerely,        Bertrand Potter DO

## 2020-02-27 NOTE — TELEPHONE ENCOUNTER
Reason for Call:  Medication or medication refill:    Do you use a [facility]?  Name of the pharmacy and phone number for the current request:  [place], [place] - [phone]    Name of the medication requested: Symbicort     Other request: Per pharmacist, medication is too expensive and patient would like a cheaper option instead    Can we leave a detailed message on this number? YES    Phone number patient can be reached at: Other phone number: [facility] - [phone]    Best Time:     Call taken on 2/27/2020 at 2:43 PM by Rocio Lackey

## 2020-02-28 ASSESSMENT — ASTHMA QUESTIONNAIRES: ACT_TOTALSCORE: 18

## 2020-02-28 NOTE — PROGRESS NOTES
ALLERGY SOLUTION NEW REQUEST    Maryam Carlson 2000 MRN: [id number]    DATE NEEDED:  2 weeks  Vial Color Content   Top Dose         Vial Size  Green 1:1,000, Blue 1:100, Yellow 1:10 and Red 1:1 Molds  Red 1:1 0.5 5mL  Green 1:1,000, Blue 1:100, Yellow 1:10 and Red 1:1 Cat, Dog, Grass, Dust Mite  Red 1:1 0.5 5mL  Green 1:1,000, Blue 1:100, Yellow 1:10 and Red 1:1 Trees  Red 1:1 0.5 5mL  Green 1:1,000, Blue 1:100, Yellow 1:10 and Red 1:1 Weeds  Red 1:1 0.5 5mL        Shot Clinic Location:  [place]  Ship to Location: [place]  Special Instructions:  none        Requester Signature  Bertrand Potter, DO

## 2020-03-12 DIAGNOSIS — J30.2 SEASONAL ALLERGIC RHINITIS: Primary | ICD-10-CM

## 2020-03-12 PROCEDURE — 95165 ANTIGEN THERAPY SERVICES: CPT | Mod: 59 | Performed by: ALLERGY & IMMUNOLOGY

## 2020-03-12 PROCEDURE — 95165 ANTIGEN THERAPY SERVICES: CPT | Performed by: ALLERGY & IMMUNOLOGY

## 2020-03-12 NOTE — PROGRESS NOTES
Allergy serums billed at [facility].     Vials billed below:    Vial Color Content                      Vial Size Expiration Date  Vial Color Content                      Vial Size Expiration Date  Red 1:1 Molds 5mL  9/3/2020  Blue 1:100 Molds 5mL  3/3/2021  Yellow 1:10 Molds 5mL  3/3/2021  Red 1:1 Molds 5mL  3/3/2021    Green 1:1,000 Weeds 5mL  9/3/2020  Blue 1:100 Weeds 5mL  3/3/2021  Yellow 1:10 Weeds 5mL  3/3/2021  Red 1:1 Weeds 5mL  3/3/2021    Green 1:1,000 Cat, Dog, Grass, Dust Mite 5mL  9/3/2020  Blue 1:100 Cat, Dog, Grass, Dust Mite 5mL  3/3/2021  Yellow 1:10 Cat, Dog, Grass, Dust Mite 5mL  3/3/2021  Red 1:1 Cat, Dog, Grass, Dust Mite 5mL  3/3/2021    Green 1:1,000 Trees 5mL  9/3/2020  Blue 1:100 Trees 5mL  3/3/2021  Yellow 1:10 Trees 5mL  3/3/2021  Red 1:1 Trees 5mL  3/3/2021    Original Refill encounter date: 2/27/2020    Signature  Katie Villarreal MA, CMA ......3/12/2020...1:51 PM

## 2020-03-12 NOTE — PROGRESS NOTES
Allergy serums received at [place].     Vials received below:    Vial Color Content                      Vial Size Expiration Date  Red 1:1 Molds 5mL  9/3/2020  Blue 1:100 Molds 5mL  3/3/2021  Yellow 1:10 Molds 5mL  3/3/2021  Red 1:1 Molds 5mL  3/3/2021    Green 1:1,000 Weeds 5mL  9/3/2020  Blue 1:100 Weeds 5mL  3/3/2021  Yellow 1:10 Weeds 5mL  3/3/2021  Red 1:1 Weeds 5mL  3/3/2021    Green 1:1,000 Cat, Dog, Grass, Dust Mite 5mL  9/3/2020  Blue 1:100 Cat, Dog, Grass, Dust Mite 5mL  3/3/2021  Yellow 1:10 Cat, Dog, Grass, Dust Mite 5mL  3/3/2021  Red 1:1 Cat, Dog, Grass, Dust Mite 5mL  3/3/2021    Green 1:1,000 Trees 5mL  9/3/2020  Blue 1:100 Trees 5mL  3/3/2021  Yellow 1:10 Trees 5mL  3/3/2021  Red 1:1 Trees 5mL  3/3/2021      Signature  Katie Villarreal MA, CMA ......3/12/2020...1:50 PM

## 2020-03-13 ENCOUNTER — OFFICE VISIT (OUTPATIENT)
Dept: ALLERGY | Facility: CLINIC | Age: 20
End: 2020-03-13
Payer: COMMERCIAL

## 2020-03-13 VITALS
DIASTOLIC BLOOD PRESSURE: 60 MMHG | BODY MASS INDEX: 19.63 KG/M2 | WEIGHT: 115 LBS | TEMPERATURE: 98.2 F | OXYGEN SATURATION: 100 % | HEART RATE: 80 BPM | HEIGHT: 64 IN | SYSTOLIC BLOOD PRESSURE: 95 MMHG

## 2020-03-13 DIAGNOSIS — J30.89 ALLERGIC RHINITIS DUE TO DUST MITE: ICD-10-CM

## 2020-03-13 DIAGNOSIS — J30.89 ALLERGIC RHINITIS DUE TO MOLD: ICD-10-CM

## 2020-03-13 DIAGNOSIS — J30.1 SEASONAL ALLERGIC RHINITIS DUE TO POLLEN: Primary | ICD-10-CM

## 2020-03-13 DIAGNOSIS — J30.81 ALLERGIC RHINITIS DUE TO ANIMAL DANDER: ICD-10-CM

## 2020-03-13 PROCEDURE — 95180 RAPID DESENSITIZATION: CPT | Performed by: ALLERGY & IMMUNOLOGY

## 2020-03-13 PROCEDURE — 99207 ZZC DROP WITH A PROCEDURE: CPT | Performed by: ALLERGY & IMMUNOLOGY

## 2020-03-13 ASSESSMENT — MIFFLIN-ST. JEOR: SCORE: 1276.64

## 2020-03-13 ASSESSMENT — PAIN SCALES - GENERAL: PAINLEVEL: NO PAIN (0)

## 2020-03-13 NOTE — PROGRESS NOTES
Maryam Carlson is a 20 year old White female with previous medical history significant for allergic rhinitis who returns for a follow up visit.     Patient presents today for cluster immunotherapy. The patient is currently in a good state of health. No recent fevers, chills, cough, wheezing, shortness of breath, skin rash, angioedema, nausea, vomiting or diarrhea. The risks and benefits were discussed and the patient/patient's family wishes to proceed. The consent was signed.        Past Medical History:   Diagnosis Date     Asthma      Family History   Problem Relation Age of Onset     Cancer Paternal Grandfather      Diabetes Paternal Grandfather      Diabetes Maternal Grandmother      Hypertension Maternal Grandmother      Thyroid Disease Maternal Grandfather      Cancer Maternal Aunt      Cerebrovascular Disease No family hx of      Glaucoma No family hx of      Macular Degeneration No family hx of      History reviewed. No pertinent surgical history.    REVIEW OF SYSTEMS:  General: negative for weight gain. negative for weight loss. negative for changes in sleep.   Ears: negative for fullness. negative for hearing loss. negative for dizziness.   Nose: negative for snoring.negative for changes in smell. negative for drainage.   Eyes: negative for eye watering. negative for eye itching. negative for vision changes. negative for eye redness.  Throat: negative for hoarseness. negative for sore throat. negative for trouble swallowing.   Lungs: negative for shortness of breath.negative for wheezing. negative for sputum production.   Cardiovascular: negative for chest pain. negative for swelling of ankles. negative for fast or irregular heartbeat.   Gastrointestinal: negative for nausea. negative for heartburn. negative for acid reflux.   Musculoskeletal: negative for joint pain. negative for joint stiffness. negative for joint swelling.   Neurologic: negative for seizures. negative for fainting. negative for weakness.    Psychiatric: negative for changes in mood. negative for anxiety.   Endocrine: negative for cold intolerance. negative for heat intolerance. negative for tremors.   Lymphatic: negative for lower extremity swelling. negative for lymph node swelling.   Hematologic: negative for easy bruising. negative for easy bleeding.  Integumentary: negative for rash. negative for scaling. negative for nail changes.       Current Outpatient Medications:      albuterol (PROAIR HFA/PROVENTIL HFA/VENTOLIN HFA) 108 (90 Base) MCG/ACT inhaler, Inhale 2-4 puffs into the lungs every 4 hours as needed for shortness of breath / dyspnea or wheezing, Disp: 1 Inhaler, Rfl: 1     albuterol (PROAIR HFA/PROVENTIL HFA/VENTOLIN HFA) 108 (90 Base) MCG/ACT inhaler, Inhale 2 puffs into the lungs every 6 hours as needed for shortness of breath / dyspnea or wheezing, Disp: 1 Inhaler, Rfl: 3     budesonide-formoterol (SYMBICORT) 80-4.5 MCG/ACT Inhaler, Symbicort 80/4.5mcg 2 puffs inhaled every 4 hours as needed for chest tightness, coughing, wheezing or shortness of breath., Disp: 1 Inhaler, Rfl: 3     cephALEXin (KEFLEX) 500 MG capsule, Take 1 capsule (500 mg) by mouth 2 times daily, Disp: 14 capsule, Rfl: 0     citalopram (CELEXA) 20 MG tablet, 1 tablet daily, Disp: 90 tablet, Rfl: 1     EPINEPHrine (AUVI-Q) 0.3 MG/0.3ML injection 2-pack, Inject 0.3 mLs (0.3 mg) into the muscle as needed for anaphylaxis, Disp: 2 each, Rfl: 1     montelukast (SINGULAIR) 10 MG tablet, Take 1 tablet (10 mg) by mouth At Bedtime, Disp: 30 tablet, Rfl: 4     olopatadine (PATANOL) 0.1 % ophthalmic solution, Place 1 drop into both eyes 2 times daily, Disp: 1 Bottle, Rfl: 4     ORDER FOR ALLERGEN IMMUNOTHERAPY, Alternaria Tenuis 1:10 w/v, HS  0.5 ml Curvularia Spicifera 1:10 w/v, HS  0.5 ml Epicoccum Nigrum 1:10 w/v, HS 0.5 ml Diluent: HSA qs to 5ml, Disp: 5 mL, Rfl: prn     ORDER FOR ALLERGEN IMMUNOTHERAPY, Cat Hair, Standardized A.P. 10,000 BAU/mL, HS  2.0 ml Dog Hair-Dander,  A. P.  1:100 w/v, HS  1.0 ml Dust Mites DF. 10,000 AU/mL, HS  0.5 ml Dust Mites DP. 10,000 AU/mL, HS  0.5 ml  Abdulkadir Grass 1:20 w/v, HS 0.5 ml Peter Grass (Std) 100,000 BAU/mL, HS 0.4 ml Diluent: HSA qs to 5ml, Disp: 5 mL, Rfl: prn     ORDER FOR ALLERGEN IMMUNOTHERAPY, Omar, White 1:20 w/v, HS  0.5 ml Birch Mix PRW 1:20 w/v, HS  0.5 ml Boxelder-Maple Mix BHR (Boxelder Hard Red) 1:20 w/v, HS  0.5 ml [place], Common 1:20 w/v, HS  0.5 ml Elm, American 1:20 w/v, HS  0.5 ml Hackberry 1:20 w/v, HS 0.5 ml Hickory, Shagbark 1:20 w/v, HS  0.5 ml [place] Mix RW 1:20 w/v, HS 0.5 ml [place] Tree, Black 1:20 w/v, HS 0.5 ml [place], Black 1:20 w/v, HS 0.5 ml Diluent: HSA qs to 5ml, Disp: 5 mL, Rfl: prn     ORDER FOR ALLERGEN IMMUNOTHERAPY, Cocklebur, Common 1:20 w/v, HS 0.5 ml Kochia 1:20 w/v, HS 0.5 ml Lamb's Quarters 1:20 w/v, HS 0.5 ml Marshelder-Povertyweed 1:20 w/v, HS 0.5 ml Nettle 1:20 w/v, HS 0.5 ml Pigweed, Careless 1:20 w/v, HS 0.5 ml Plantain, English 1:20 w/v, HS 0.5 ml Ragweed, Mix (Giant, Short) 1:20 w/v, HS 0.5 ml Russian Thistle 1:20 w/v, HS 0.5 ml Sorrel, Sheep 1:20 w/v, HS 0.5 ml Diluent: HSA qs to 5ml, Disp: 5 mL, Rfl: prn     ranitidine (ZANTAC) 300 MG tablet, Take 1 tablet (300 mg) by mouth At Bedtime, Disp: 30 tablet, Rfl: 1     triamcinolone (NASACORT) 55 MCG/ACT nasal aerosol, Spray 2 sprays into both nostrils daily, Disp: 2 Bottle, Rfl: 3  Immunization History   Administered Date(s) Administered     DTAP (<7y) 2000, 2000, 2000, 10/17/2001, 04/27/2004     HEPA 08/17/2011, 02/17/2012     HPV 07/30/2014, 11/07/2014, 03/09/2015     HepB 2000, 2000, 01/31/2001     Hib (PRP-T) 2000, 2000, 01/31/2001     MMR 10/17/2001, 04/27/2004     Meningococcal (Menactra ) 08/17/2011, 06/20/2017     Poliovirus, inactivated (IPV) 2000, 2000, 2000, 10/17/2001, 04/27/2004     TDAP Vaccine (Adacel) 08/17/2011     Varicella 01/31/2001, 11/09/2009     Allergies    Allergen Reactions     Penicillins Hives     Tree Nuts [Nuts] Itching         EXAM:   Constitutional:  Appears well-developed and well-nourished. No distress.   HEENT:   Head: Normocephalic.   No cobblestoning of posterior oropharynx.   Nasal tissue pink and normal appearing.  No rhinorrhea noted.    Eyes: Conjunctivae are non-erythematous   Cardiovascular: Normal rate, regular rhythm and normal heart sounds. Exam reveals no gallop and no friction rub.   No murmur heard.  Respiratory: Effort normal and breath sounds normal. No respiratory distress. No wheezes. No rales.   Musculoskeletal: Normal range of motion.   Neuro: Oriented to person, place, and time.  Skin: Skin is warm and dry. No rash noted.   Psychiatric: Normal mood and affect.     Nursing note and vitals reviewed.    ASSESSMENT/PLAN:  Problem List Items Addressed This Visit        Respiratory    Allergic rhinitis due to dust mite     Perennial with spring and fall allergy symptoms.  Increase him times around cats.  Nasal and ocular symptoms.  Still symptomatic despite oral antihistamines, nasal corticosteroids and ocular antihistamines.     Allergy testing:  Positive for trees, weeds, cat, dog, dust mite, grass, molds.     Cluster immunotherapy   The patient received green 0.1, green 0.4 and blue 0.1ml today. Each dose was  by 30 minutes. Full report will be scanned into electronic medical record. The pateint was in office for over 1.5 hours.      -Nasacort 2 sprays per nostril daily.  -Zyrtec daily.  -Patanol 1 drop per eye twice daily as needed.  -Singulair 10 mg by mouth daily.  -Continue allergy shots.          Relevant Orders    RAPID DESENSITIZATION (Completed)    Allergic rhinitis due to animal dander    Relevant Orders    RAPID DESENSITIZATION (Completed)    Seasonal allergic rhinitis due to pollen - Primary    Relevant Orders    RAPID DESENSITIZATION (Completed)    Allergic rhinitis due to mold    Relevant Orders    RAPID  DESENSITIZATION (Completed)          Chart documentation with Dragon Voice recognition Software. Although reviewed after completion, some words and grammatical errors may remain.    Bertrand Potter DO FAAAAI  Allergy/Immunology  [facility] and [place]

## 2020-03-13 NOTE — PATIENT INSTRUCTIONS
Allergy Staff Appt Hours Shot Hours Locations    Physician     Bertrand Potter DO       Support Staff     NADIA Doran, SORAYA  Tuesday:        [place] 7-4:20     Wednesday:        [place]: 7-5     Thursday:                    [place] 7-6:40     Friday:  [place]  7-2:40   [place]        Thursday: 1-5:50        Friday: 7-10:50     [place]        Tuesday: 7- 3:20        Wednesday: 7-4:20     [place] Monday: 7-4:20        Tuesday: 1-6:20         [facility]  [address] [place] [address]  Appt Line: [phone]  Allergy RN:  [phone]    [facility]  [address] [place] [address]  Appt Line: [phone]  Allergy RN:  [phone]       Important Scheduling Information  Aspirin Desensitization: Appt will last 2 clinic days. Please call the Allergy RN line for your clinic to schedule. Discontinue antihistamines 7 days prior to the appointment.     Food Challenges: Appt will last 3-4 hours. Please call the Allergy RN line for your clinic to schedule. Discontinue antihistamines 7 days prior to the appointment.     Penicillin Testing: Appt will last 2-3 hours. Please call the Allergy RN line for your clinic to schedule. Discontinue antihistamines 7 days prior to the appointment.     Skin Testing: Appt will about 40 minutes. Call the appointment line for your clinic to schedule. Discontinue antihistamines 7 days prior to the appointment.     Venom Testing: Appt will last 2-3 hours. Please call the Allergy RN line for your clinic to schedule. Discontinue antihistamines 7 days prior to the appointment.     Thank you for trusting us with your Allergy, Asthma, and Immunology care. Please feel free to contact us with any questions or concerns you may have.

## 2020-03-13 NOTE — LETTER
3/13/2020         RE: Maryam Carlson  [address]  [place] [address]        Dear Colleague,    Thank you for referring your patient, Maryam Carlson, to the [facility]. Please see a copy of my visit note below.    Maryam Carlson is a 20 year old White female with previous medical history significant for allergic rhinitis who returns for a follow up visit.     Patient presents today for cluster immunotherapy. The patient is currently in a good state of health. No recent fevers, chills, cough, wheezing, shortness of breath, skin rash, angioedema, nausea, vomiting or diarrhea. The risks and benefits were discussed and the patient/patient's family wishes to proceed. The consent was signed.        Past Medical History:   Diagnosis Date     Asthma      Family History   Problem Relation Age of Onset     Cancer Paternal Grandfather      Diabetes Paternal Grandfather      Diabetes Maternal Grandmother      Hypertension Maternal Grandmother      Thyroid Disease Maternal Grandfather      Cancer Maternal Aunt      Cerebrovascular Disease No family hx of      Glaucoma No family hx of      Macular Degeneration No family hx of      History reviewed. No pertinent surgical history.    REVIEW OF SYSTEMS:  General: negative for weight gain. negative for weight loss. negative for changes in sleep.   Ears: negative for fullness. negative for hearing loss. negative for dizziness.   Nose: negative for snoring.negative for changes in smell. negative for drainage.   Eyes: negative for eye watering. negative for eye itching. negative for vision changes. negative for eye redness.  Throat: negative for hoarseness. negative for sore throat. negative for trouble swallowing.   Lungs: negative for shortness of breath.negative for wheezing. negative for sputum production.   Cardiovascular: negative for chest pain. negative for swelling of ankles. negative for fast or irregular heartbeat.   Gastrointestinal: negative for  nausea. negative for heartburn. negative for acid reflux.   Musculoskeletal: negative for joint pain. negative for joint stiffness. negative for joint swelling.   Neurologic: negative for seizures. negative for fainting. negative for weakness.   Psychiatric: negative for changes in mood. negative for anxiety.   Endocrine: negative for cold intolerance. negative for heat intolerance. negative for tremors.   Lymphatic: negative for lower extremity swelling. negative for lymph node swelling.   Hematologic: negative for easy bruising. negative for easy bleeding.  Integumentary: negative for rash. negative for scaling. negative for nail changes.       Current Outpatient Medications:      albuterol (PROAIR HFA/PROVENTIL HFA/VENTOLIN HFA) 108 (90 Base) MCG/ACT inhaler, Inhale 2-4 puffs into the lungs every 4 hours as needed for shortness of breath / dyspnea or wheezing, Disp: 1 Inhaler, Rfl: 1     albuterol (PROAIR HFA/PROVENTIL HFA/VENTOLIN HFA) 108 (90 Base) MCG/ACT inhaler, Inhale 2 puffs into the lungs every 6 hours as needed for shortness of breath / dyspnea or wheezing, Disp: 1 Inhaler, Rfl: 3     budesonide-formoterol (SYMBICORT) 80-4.5 MCG/ACT Inhaler, Symbicort 80/4.5mcg 2 puffs inhaled every 4 hours as needed for chest tightness, coughing, wheezing or shortness of breath., Disp: 1 Inhaler, Rfl: 3     cephALEXin (KEFLEX) 500 MG capsule, Take 1 capsule (500 mg) by mouth 2 times daily, Disp: 14 capsule, Rfl: 0     citalopram (CELEXA) 20 MG tablet, 1 tablet daily, Disp: 90 tablet, Rfl: 1     EPINEPHrine (AUVI-Q) 0.3 MG/0.3ML injection 2-pack, Inject 0.3 mLs (0.3 mg) into the muscle as needed for anaphylaxis, Disp: 2 each, Rfl: 1     montelukast (SINGULAIR) 10 MG tablet, Take 1 tablet (10 mg) by mouth At Bedtime, Disp: 30 tablet, Rfl: 4     olopatadine (PATANOL) 0.1 % ophthalmic solution, Place 1 drop into both eyes 2 times daily, Disp: 1 Bottle, Rfl: 4     ORDER FOR ALLERGEN IMMUNOTHERAPY, Alternaria Tenuis 1:10 w/v,  HS  0.5 ml Curvularia Spicifera 1:10 w/v, HS  0.5 ml Epicoccum Nigrum 1:10 w/v, HS 0.5 ml Diluent: HSA qs to 5ml, Disp: 5 mL, Rfl: prn     ORDER FOR ALLERGEN IMMUNOTHERAPY, Cat Hair, Standardized A.P. 10,000 BAU/mL, HS  2.0 ml Dog Hair-Dander, A. P.  1:100 w/v, HS  1.0 ml Dust Mites DF. 10,000 AU/mL, HS  0.5 ml Dust Mites DP. 10,000 AU/mL, HS  0.5 ml  Abdulkadir Grass 1:20 w/v, HS 0.5 ml Peter Grass (Std) 100,000 BAU/mL, HS 0.4 ml Diluent: HSA qs to 5ml, Disp: 5 mL, Rfl: prn     ORDER FOR ALLERGEN IMMUNOTHERAPY, Omar, White 1:20 w/v, HS  0.5 ml Birch Mix PRW 1:20 w/v, HS  0.5 ml Boxelder-Maple Mix BHR (Boxelder Hard Red) 1:20 w/v, HS  0.5 ml [place], Common 1:20 w/v, HS  0.5 ml Elm, American 1:20 w/v, HS  0.5 ml Hackberry 1:20 w/v, HS 0.5 ml Hickory, Shagbark 1:20 w/v, HS  0.5 ml [place] Mix RW 1:20 w/v, HS 0.5 ml [place] Tree, Black 1:20 w/v, HS 0.5 ml [place], Black 1:20 w/v, HS 0.5 ml Diluent: HSA qs to 5ml, Disp: 5 mL, Rfl: prn     ORDER FOR ALLERGEN IMMUNOTHERAPY, Cocklebur, Common 1:20 w/v, HS 0.5 ml Kochia 1:20 w/v, HS 0.5 ml Lamb's Quarters 1:20 w/v, HS 0.5 ml Marshelder-Povertyweed 1:20 w/v, HS 0.5 ml Nettle 1:20 w/v, HS 0.5 ml Pigweed, Careless 1:20 w/v, HS 0.5 ml Plantain, English 1:20 w/v, HS 0.5 ml Ragweed, Mix (Giant, Short) 1:20 w/v, HS 0.5 ml Russian Thistle 1:20 w/v, HS 0.5 ml Sorrel, Sheep 1:20 w/v, HS 0.5 ml Diluent: HSA qs to 5ml, Disp: 5 mL, Rfl: prn     ranitidine (ZANTAC) 300 MG tablet, Take 1 tablet (300 mg) by mouth At Bedtime, Disp: 30 tablet, Rfl: 1     triamcinolone (NASACORT) 55 MCG/ACT nasal aerosol, Spray 2 sprays into both nostrils daily, Disp: 2 Bottle, Rfl: 3  Immunization History   Administered Date(s) Administered     DTAP (<7y) 2000, 2000, 2000, 10/17/2001, 04/27/2004     HEPA 08/17/2011, 02/17/2012     HPV 07/30/2014, 11/07/2014, 03/09/2015     HepB 2000, 2000, 01/31/2001     Hib (PRP-T) 2000, 2000, 01/31/2001     MMR 10/17/2001,  04/27/2004     Meningococcal (Menactra ) 08/17/2011, 06/20/2017     Poliovirus, inactivated (IPV) 2000, 2000, 2000, 10/17/2001, 04/27/2004     TDAP Vaccine (Adacel) 08/17/2011     Varicella 01/31/2001, 11/09/2009     Allergies   Allergen Reactions     Penicillins Hives     Tree Nuts [Nuts] Itching         EXAM:   Constitutional:  Appears well-developed and well-nourished. No distress.   HEENT:   Head: Normocephalic.   No cobblestoning of posterior oropharynx.   Nasal tissue pink and normal appearing.  No rhinorrhea noted.    Eyes: Conjunctivae are non-erythematous   Cardiovascular: Normal rate, regular rhythm and normal heart sounds. Exam reveals no gallop and no friction rub.   No murmur heard.  Respiratory: Effort normal and breath sounds normal. No respiratory distress. No wheezes. No rales.   Musculoskeletal: Normal range of motion.   Neuro: Oriented to person, place, and time.  Skin: Skin is warm and dry. No rash noted.   Psychiatric: Normal mood and affect.     Nursing note and vitals reviewed.    ASSESSMENT/PLAN:  Problem List Items Addressed This Visit        Respiratory    Allergic rhinitis due to dust mite     Perennial with spring and fall allergy symptoms.  Increase him times around cats.  Nasal and ocular symptoms.  Still symptomatic despite oral antihistamines, nasal corticosteroids and ocular antihistamines.     Allergy testing:  Positive for trees, weeds, cat, dog, dust mite, grass, molds.     Cluster immunotherapy   The patient received green 0.1, green 0.4 and blue 0.1ml today. Each dose was  by 30 minutes. Full report will be scanned into electronic medical record. The pateint was in office for over 1.5 hours.      -Nasacort 2 sprays per nostril daily.  -Zyrtec daily.  -Patanol 1 drop per eye twice daily as needed.  -Singulair 10 mg by mouth daily.  -Continue allergy shots.          Relevant Orders    RAPID DESENSITIZATION (Completed)    Allergic rhinitis due to animal  dander    Relevant Orders    RAPID DESENSITIZATION (Completed)    Seasonal allergic rhinitis due to pollen - Primary    Relevant Orders    RAPID DESENSITIZATION (Completed)    Allergic rhinitis due to mold    Relevant Orders    RAPID DESENSITIZATION (Completed)          Chart documentation with Dragon Voice recognition Software. Although reviewed after completion, some words and grammatical errors may remain.    Bertrand Potter DO FAAAAI  Allergy/Immunology  [place], [place]      Again, thank you for allowing me to participate in the care of your patient.        Sincerely,        Bertrand Potter DO

## 2020-03-31 ENCOUNTER — TELEPHONE (OUTPATIENT)
Dept: ALLERGY | Facility: CLINIC | Age: 20
End: 2020-03-31

## 2020-03-31 NOTE — TELEPHONE ENCOUNTER
Spoke with patient.  Allergy shots on hold until further notice.  We will contact patients when restart shots.     Katie Villarreal MA, CMA ......3/31/2020...1:14 PM

## 2020-06-15 NOTE — PROGRESS NOTES
SUBJECTIVE:   Maryam Carlson is a 19 year old female who presents to clinic today for the following   health issues:    Maryam was recently seen and treated for anxiety. She has completed the 1/2 tablets of medication and will be increasing to the full tablets. She developed chest pain x 2 days. The pain is in the evening and worse when she is lying down. She also feels better when she is leaning forward. No shortness of breath. She is active during the day and the pain is not noticed.         Additional history: as documented    Reviewed  and updated as needed this visit by clinical staff  Tobacco  Allergies         Reviewed and updated as needed this visit by Provider         There is no problem list on file for this patient.    No past surgical history on file.    Social History     Tobacco Use     Smoking status: Passive Smoke Exposure - Never Smoker     Smokeless tobacco: Never Used     Tobacco comment: Lives in smoke free household, Dad smokes outside   Substance Use Topics     Alcohol use: No     Family History   Problem Relation Age of Onset     Cancer Paternal Grandfather      Diabetes Paternal Grandfather      Diabetes Maternal Grandmother      Hypertension Maternal Grandmother      Thyroid Disease Maternal Grandfather      Cancer Maternal Aunt      Cerebrovascular Disease No family hx of      Glaucoma No family hx of      Macular Degeneration No family hx of          Current Outpatient Medications   Medication Sig Dispense Refill     citalopram (CELEXA) 20 MG tablet 1/2 tablet x 6 days, then 1 tablet daily 30 tablet 1     ranitidine (ZANTAC) 300 MG tablet Take 1 tablet (300 mg) by mouth At Bedtime 30 tablet 1     albuterol (PROAIR HFA/PROVENTIL HFA/VENTOLIN HFA) 108 (90 Base) MCG/ACT inhaler Inhale 2 puffs into the lungs every 6 hours as needed for shortness of breath / dyspnea or wheezing (Patient not taking: Reported on 5/16/2019) 1 Inhaler 3     Allergies   Allergen Reactions     Penicillins Hives  "    Tree Nuts [Nuts] Itching     BP Readings from Last 3 Encounters:   05/16/19 119/75   05/09/19 128/77   12/17/18 121/74    Wt Readings from Last 3 Encounters:   05/16/19 55.8 kg (123 lb) (42 %)*   05/09/19 56.2 kg (124 lb) (44 %)*   12/17/18 53.1 kg (117 lb) (31 %)*     * Growth percentiles are based on [facility] (Girls, 2-20 Years) data.            Review Of Systems  Skin: negative  Eyes: negative  Ears/Nose/Throat: negative  Respiratory: No shortness of breath, dyspnea on exertion, cough, or hemoptysis  Cardiovascular: as above  Gastrointestinal: negative  Genitourinary: negative  Musculoskeletal: negative  Neurologic: negative  Psychiatric: anxiety  Hematologic/Lymphatic/Immunologic: negative  Endocrine: negative      EXAM:  Vital signs:                      Weight: 55.8 kg (123 lb)  Estimated body mass index is 21.45 kg/m  as calculated from the following:    Height as of 12/17/18: 1.613 m (5' 3.5\").    Weight as of this encounter: 55.8 kg (123 lb).    Constitutional: healthy, alert and no distress   Cardiovascular: negative, PMI normal. No lifts, heaves, or thrills. RRR. No murmurs, clicks gallops or rub  Respiratory: negative, Percussion normal. Good diaphragmatic excursion. Lungs clear  Psychiatric: mentation appears normal and affect normal/bright  Abdomen: Abdomen soft, non-tender. BS normal. No masses, organomegaly  NEURO: Gait normal. Reflexes normal and symmetric. Sensation grossly WNL.  SKIN: no suspicious lesions or rashes  JOINT/EXTREMITIES: extremities normal- no gross deformities noted, gait normal and normal muscle tone      Assessment & Plan     1. Chest pain, unspecified type  EKG and CXR reviewed with Maryam today.   She does not have the pain during the day.   Plan to use ranitidine 150-300 mg daily.   Avoid eating before bed.   Return if symptoms worsen or persist.   - EKG 12-lead complete w/read - Clinics  - XR Chest 2 Views  - ranitidine (ZANTAC) 300 MG tablet; Take 1 tablet (300 mg) by mouth " At Bedtime  Dispense: 30 tablet; Refill: 1       No follow-ups on file.    Kristen M. Kehr, PA-C  [facility]         No

## 2020-06-29 ENCOUNTER — MYC REFILL (OUTPATIENT)
Dept: ALLERGY | Facility: CLINIC | Age: 20
End: 2020-06-29

## 2020-06-29 DIAGNOSIS — J30.89 ALLERGIC RHINITIS DUE TO DUST MITE: ICD-10-CM

## 2020-06-29 RX ORDER — TRIAMCINOLONE ACETONIDE 55 UG/1
2 SPRAY, METERED NASAL DAILY
Qty: 2 BOTTLE | Refills: 3 | Status: SHIPPED | OUTPATIENT
Start: 2020-06-29 | End: 2020-10-15

## 2020-06-29 RX ORDER — OLOPATADINE HYDROCHLORIDE 1 MG/ML
1 SOLUTION/ DROPS OPHTHALMIC 2 TIMES DAILY
Qty: 1 BOTTLE | Refills: 4 | Status: SHIPPED | OUTPATIENT
Start: 2020-06-29 | End: 2020-10-15

## 2020-06-29 NOTE — TELEPHONE ENCOUNTER
"Requested Prescriptions   Pending Prescriptions Disp Refills     triamcinolone (NASACORT) 55 MCG/ACT nasal aerosol 2 Bottle 3     Sig: Spray 2 sprays into both nostrils daily       Nasal Allergy Protocol Passed - 6/29/2020  8:49 AM        Passed - Patient is age 12 or older        Passed - Medication is active on med list           olopatadine (PATANOL) 0.1 % ophthalmic solution 1 Bottle 4     Sig: Place 1 drop into both eyes 2 times daily       Miscellaneous Opthalmic Allergy Drops Protocol Passed - 6/29/2020  8:49 AM        Passed - Patient is age 4 or older        Passed - Recent (12 mo) or future (30 days) visit within the authorizing provider's specialty     Patient has had an office visit with the authorizing provider or a provider within the authorizing providers department within the previous 12 mos or has a future within next 30 days. See \"Patient Info\" tab in inbasket, or \"Choose Columns\" in Meds & Orders section of the refill encounter.              Passed - Medication is active on med list        Passed - Patient is not pregnant        Passed - No positive pregnancy test on record in past 12 mos           Medication filled per [facility] protocol.     Patricia Stapleton RN  "

## 2020-07-09 ENCOUNTER — TELEPHONE (OUTPATIENT)
Dept: ALLERGY | Facility: CLINIC | Age: 20
End: 2020-07-09

## 2020-10-08 ENCOUNTER — TELEPHONE (OUTPATIENT)
Dept: ALLERGY | Facility: CLINIC | Age: 20
End: 2020-10-08

## 2020-10-08 NOTE — TELEPHONE ENCOUNTER
Patient's Green 1:1,000 serums  on 2020. Last injection given 2020. Serums discarded.    Sandy Donaldson RN

## 2020-10-15 ENCOUNTER — MYC REFILL (OUTPATIENT)
Dept: ALLERGY | Facility: CLINIC | Age: 20
End: 2020-10-15

## 2020-10-15 DIAGNOSIS — J30.89 ALLERGIC RHINITIS DUE TO DUST MITE: ICD-10-CM

## 2020-10-15 RX ORDER — OLOPATADINE HYDROCHLORIDE 1 MG/ML
1 SOLUTION/ DROPS OPHTHALMIC 2 TIMES DAILY
Qty: 1 BOTTLE | Refills: 4 | Status: SHIPPED | OUTPATIENT
Start: 2020-10-15

## 2020-10-15 RX ORDER — TRIAMCINOLONE ACETONIDE 55 UG/1
2 SPRAY, METERED NASAL DAILY
Qty: 2 BOTTLE | Refills: 3 | Status: SHIPPED | OUTPATIENT
Start: 2020-10-15

## 2020-10-15 NOTE — TELEPHONE ENCOUNTER
Signed Prescriptions:                        Disp   Refills    triamcinolone (NASACORT) 55 MCG/ACT nasal *2 Jose Rafael*3        Sig: Spray 2 sprays into both nostrils daily  Authorizing Provider: NAOMI JOSÉ  Ordering User: BASSEM STREETER    olopatadine (PATANOL) 0.1 % ophthalmic sol*1 Jose Rafael*4        Sig: Place 1 drop into both eyes 2 times daily  Authorizing Provider: NAOMI JOSÉ  Ordering User: BASSEM STREETER RN refilled medication per G Refill Protocol.     Bassem Streeter RN

## 2020-12-06 ENCOUNTER — HEALTH MAINTENANCE LETTER (OUTPATIENT)
Age: 20
End: 2020-12-06

## 2021-03-04 ENCOUNTER — TELEPHONE (OUTPATIENT)
Dept: ALLERGY | Facility: CLINIC | Age: 21
End: 2021-03-04

## 2021-03-04 NOTE — TELEPHONE ENCOUNTER
Patient's Blue 1:100, Yellow 1:10 and Red 1:1 serums  on 2021. Last injection given 2020. Serums discarded.    Sandy Donaldson RN

## 2021-04-06 ENCOUNTER — TELEPHONE (OUTPATIENT)
Dept: FAMILY MEDICINE | Facility: CLINIC | Age: 21
End: 2021-04-06

## 2021-04-06 NOTE — TELEPHONE ENCOUNTER
Please contact this patient, she is scheduled for a video visit tomorrow 4/7.   She has not been seen in the clinic since 2019    Please have her come in for the appointment.   Kristen Kehr PA-C

## 2021-04-11 ENCOUNTER — HEALTH MAINTENANCE LETTER (OUTPATIENT)
Age: 21
End: 2021-04-11

## 2021-05-26 NOTE — NURSING NOTE
"   Chief Complaint   Patient presents with     Well Child       Initial /65  Pulse 83  Temp 99  F (37.2  C) (Oral)  Ht 5' 4\" (1.626 m)  Wt 108 lb 12.8 oz (49.4 kg)  LMP 06/04/2017  BMI 18.68 kg/m2 Estimated body mass index is 18.68 kg/(m^2) as calculated from the following:    Height as of this encounter: 5' 4\" (1.626 m).    Weight as of this encounter: 108 lb 12.8 oz (49.4 kg).  Medication Reconciliation: complete  Kristian Bolton CMA    " Otc Regimen: Recommended Cerave hydrating cleanser and moisturizer Detail Level: Simple

## 2021-07-02 ENCOUNTER — OFFICE VISIT (OUTPATIENT)
Dept: OBGYN | Facility: CLINIC | Age: 21
End: 2021-07-02
Payer: COMMERCIAL

## 2021-07-02 VITALS
BODY MASS INDEX: 21.83 KG/M2 | HEART RATE: 83 BPM | SYSTOLIC BLOOD PRESSURE: 123 MMHG | DIASTOLIC BLOOD PRESSURE: 78 MMHG | HEIGHT: 63 IN | TEMPERATURE: 98.6 F | OXYGEN SATURATION: 100 % | WEIGHT: 123.2 LBS

## 2021-07-02 DIAGNOSIS — Z01.419 ENCOUNTER FOR GYNECOLOGICAL EXAMINATION WITHOUT ABNORMAL FINDING: Primary | ICD-10-CM

## 2021-07-02 DIAGNOSIS — Z30.41 ENCOUNTER FOR SURVEILLANCE OF CONTRACEPTIVE PILLS: ICD-10-CM

## 2021-07-02 PROCEDURE — G0145 SCR C/V CYTO,THINLAYER,RESCR: HCPCS | Performed by: NURSE PRACTITIONER

## 2021-07-02 PROCEDURE — 99385 PREV VISIT NEW AGE 18-39: CPT | Performed by: NURSE PRACTITIONER

## 2021-07-02 RX ORDER — NORGESTIMATE AND ETHINYL ESTRADIOL 0.25-0.035
1 KIT ORAL DAILY
Qty: 84 TABLET | Refills: 3 | Status: SHIPPED | OUTPATIENT
Start: 2021-07-02

## 2021-07-02 RX ORDER — NORGESTIMATE AND ETHINYL ESTRADIOL 0.25-0.035
KIT ORAL
COMMUNITY
Start: 2021-06-18 | End: 2021-07-02

## 2021-07-02 ASSESSMENT — PATIENT HEALTH QUESTIONNAIRE - PHQ9
10. IF YOU CHECKED OFF ANY PROBLEMS, HOW DIFFICULT HAVE THESE PROBLEMS MADE IT FOR YOU TO DO YOUR WORK, TAKE CARE OF THINGS AT HOME, OR GET ALONG WITH OTHER PEOPLE: VERY DIFFICULT
SUM OF ALL RESPONSES TO PHQ QUESTIONS 1-9: 16
SUM OF ALL RESPONSES TO PHQ QUESTIONS 1-9: 16

## 2021-07-02 ASSESSMENT — PAIN SCALES - GENERAL: PAINLEVEL: NO PAIN (0)

## 2021-07-02 ASSESSMENT — MIFFLIN-ST. JEOR: SCORE: 1296.92

## 2021-07-02 NOTE — PROGRESS NOTES
SUBJECTIVE:   CC: Maryam Carlson is an 21 year old woman who presents for preventive health visit.     {Healthy Habits:     Getting at least 3 servings of Calcium per day:  Yes    Bi-annual eye exam:  NO    Dental care twice a year:  NO    Sleep apnea or symptoms of sleep apnea:  Daytime drowsiness    Diet:  Regular (no restrictions)    Frequency of exercise:  2-3 days/week    Duration of exercise:  30-45 minutes    Taking medications regularly:  Yes    Medication side effects:  None    PHQ-2 Total Score: 4    Additional concerns today:  No    Patient was on Celexa in the past for about a year. Does not feel it worked well for her, would like to look at alternate treatment options.    Today's PHQ-2 Score:   PHQ-2 ( 1999 [employer]) 7/2/2021   Q1: Little interest or pleasure in doing things 1   Q2: Feeling down, depressed or hopeless 3   PHQ-2 Score 4   Q1: Little interest or pleasure in doing things Several days   Q2: Feeling down, depressed or hopeless Nearly every day   PHQ-2 Score 4       Abuse: Current or Past (Physical, Sexual or Emotional) - No  Do you feel safe in your environment? Yes      Social History     Tobacco Use     Smoking status: Passive Smoke Exposure - Never Smoker     Smokeless tobacco: Never Used   Substance Use Topics     Alcohol use: No         Alcohol Use 7/2/2021   Prescreen: >3 drinks/day or >7 drinks/week? No   No flowsheet data found.    Reviewed orders with patient.  Reviewed health maintenance and updated orders accordingly - Yes  Patient Active Problem List   Diagnosis     Mild persistent asthma without complication     Pollen-food allergy, subsequent encounter     Penicillin allergy     Allergic rhinitis due to dust mite     Need for desensitization to allergens     Tree nut allergy     Allergic rhinitis due to animal dander     Seasonal allergic rhinitis due to pollen     Allergic rhinitis due to mold     Past Surgical History:   Procedure Laterality Date     NO HISTORY OF SURGERY          Social History     Tobacco Use     Smoking status: Passive Smoke Exposure - Never Smoker     Smokeless tobacco: Never Used   Substance Use Topics     Alcohol use: No     Family History   Problem Relation Age of Onset     Cancer Paternal Grandfather      Diabetes Paternal Grandfather      Diabetes Maternal Grandmother      Hypertension Maternal Grandmother      Thyroid Disease Maternal Grandfather      Cancer Maternal Aunt      Cerebrovascular Disease No family hx of      Glaucoma No family hx of      Macular Degeneration No family hx of            Breast Cancer Screening:        History of abnormal Pap smear: NO - age 21-29 PAP every 3 years recommended     Reviewed and updated as needed this visit by clinical staff  Tobacco  Allergies  Meds   Med Hx  Surg Hx  Fam Hx  Soc Hx        Reviewed and updated as needed this visit by Provider                Past Medical History:   Diagnosis Date     Asthma       Past Surgical History:   Procedure Laterality Date     NO HISTORY OF SURGERY         Review of Systems  CONSTITUTIONAL: NEGATIVE for fever, chills, change in weight  INTEGUMENTARU/SKIN: NEGATIVE for worrisome rashes, moles or lesions  EYES: NEGATIVE for vision changes or irritation  ENT: NEGATIVE for ear, mouth and throat problems  RESP: NEGATIVE for significant cough or SOB  BREAST: NEGATIVE for masses, tenderness or discharge  CV: NEGATIVE for chest pain, palpitations or peripheral edema  GI: NEGATIVE for nausea, abdominal pain, heartburn, or change in bowel habits  : NEGATIVE for unusual urinary or vaginal symptoms. Periods are regular.  MUSCULOSKELETAL: NEGATIVE for significant arthralgias or myalgia  NEURO: NEGATIVE for weakness, dizziness or paresthesias  PSYCHIATRIC: See [id number] thoughts or plans of harm to self or others.      OBJECTIVE:   /78 (BP Location: Right arm, Patient Position: Sitting, Cuff Size: Adult Regular)   Pulse 83   Temp 98.6  F (37  C) (Tympanic)   Ht 1.607 m (5'  "3.25\")   Wt 55.9 kg (123 lb 3.2 oz)   LMP 06/13/2021 (Approximate)   SpO2 100%   BMI 21.65 kg/m    Physical Exam  GENERAL: healthy, alert and no distress  EYES: Eyes grossly normal to inspection, PERRL and conjunctivae and sclerae normal  HENT: ear canals and TM's normal  NECK: no adenopathy, no asymmetry, masses, or scars and thyroid normal to palpation  RESP: lungs clear to auscultation - no rales, rhonchi or wheezes  BREAST: Declined  CV: regular rate and rhythm, normal S1 S2, no S3 or S4, no murmur, click or rub, no peripheral edema and peripheral pulses strong  ABDOMEN: soft, nontender, no hepatosplenomegaly, no masses and bowel sounds normal   (female): normal female external genitalia, normal urethral meatus, vaginal mucosa pink, moist, well rugated, and normal cervix/adnexa/uterus without masses or discharge  MS: no gross musculoskeletal defects noted, no edema  SKIN: no suspicious lesions or rashes  NEURO: Normal strength and tone, mentation intact and speech normal  PSYCH: mentation appears normal, affect normal/bright  ASSESSMENT/PLAN:   1. Encounter for gynecological examination without abnormal finding  - Pap imaged thin layer screen only - recommended age 21 - 24 years  PHQ-9 reviewed and discussed. Recommend she follow up with primary care to discuss medication management options as well as refills on her asthma medication. Appointment was offered in clinic in the next 30 minutes, but patient declined this appointment-stated she will check her schedule and set up a visit [facility]     2. Encounter for surveillance of contraceptive pills  - norgestimate-ethinyl estradiol (MONO-LINYAH) 0.25-35 MG-MCG tablet; Take 1 tablet by mouth daily  Dispense: 84 tablet; Refill: 3    COUNSELING:  Reviewed preventive health counseling, as reflected in patient instructions  Special attention given to:        Regular exercise       Healthy diet/nutrition       Contraception       Consider Hep C screening for " "all patients one time for ages 18-79 years       HIV screeninx in teen years, 1x in adult years, and at intervals if high risk    Estimated body mass index is 21.65 kg/m  as calculated from the following:    Height as of this encounter: 1.607 m (5' 3.25\").    Weight as of this encounter: 55.9 kg (123 lb 3.2 oz).        She reports that she is a non-smoker but has been exposed to tobacco smoke. She has never used smokeless tobacco.      Counseling Resources:  ATP IV Guidelines  Pooled Cohorts Equation Calculator  Breast Cancer Risk Calculator  BRCA-Related Cancer Risk Assessment: FHS-7 Tool  FRAX Risk Assessment  ICSI Preventive Guidelines  Dietary Guidelines for Americans,   [employer]'s MyPlate  ASA Prophylaxis  Lung CA Screening    ERIN Marcano [facility] [place]  Answers for HPI/ROS submitted by the patient on 2021   Annual Exam:  If you checked off any problems, how difficult have these problems made it for you to do your work, take care of things at home, or get along with other people?: Very difficult  PHQ9 TOTAL SCORE: 16    "

## 2021-07-03 ASSESSMENT — PATIENT HEALTH QUESTIONNAIRE - PHQ9: SUM OF ALL RESPONSES TO PHQ QUESTIONS 1-9: 16

## 2021-07-06 LAB
COPATH REPORT: NORMAL
PAP: NORMAL

## 2021-09-26 ENCOUNTER — HEALTH MAINTENANCE LETTER (OUTPATIENT)
Age: 21
End: 2021-09-26

## 2022-08-27 ENCOUNTER — HEALTH MAINTENANCE LETTER (OUTPATIENT)
Age: 22
End: 2022-08-27

## 2023-01-08 ENCOUNTER — HEALTH MAINTENANCE LETTER (OUTPATIENT)
Age: 23
End: 2023-01-08

## 2023-09-23 ENCOUNTER — HEALTH MAINTENANCE LETTER (OUTPATIENT)
Age: 23
End: 2023-09-23

## 2025-02-14 NOTE — ASSESSMENT & PLAN NOTE
Mouth itching, throat itching and throat tightness with tree nuts, banana and cantaloupe.    Positive testing for tree nuts. Possible oral allergy syndrome as noted below vs IgE mediated food allergy. Anaphylaxis action plan provided and reviewed. Script for epipen provided.     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 This can occur in some uncooked fruits and vegetables, but does not occur with cooked fruits and vegetables. Risk of systemic reaction is low. The patient should avoid raw fruits and vegetables that cause symptoms, but okay to consume in the cooked form.      No